# Patient Record
Sex: FEMALE | Race: WHITE | NOT HISPANIC OR LATINO | Employment: FULL TIME | URBAN - METROPOLITAN AREA
[De-identification: names, ages, dates, MRNs, and addresses within clinical notes are randomized per-mention and may not be internally consistent; named-entity substitution may affect disease eponyms.]

---

## 2020-03-30 ENCOUNTER — COMMUNICATION - HEALTHEAST (OUTPATIENT)
Dept: SCHEDULING | Facility: CLINIC | Age: 30
End: 2020-03-30

## 2020-03-31 ENCOUNTER — HOSPITAL ENCOUNTER (OUTPATIENT)
Dept: CT IMAGING | Facility: CLINIC | Age: 30
Discharge: HOME | End: 2020-03-31
Attending: FAMILY MEDICINE

## 2020-03-31 ENCOUNTER — OFFICE VISIT - HEALTHEAST (OUTPATIENT)
Dept: FAMILY MEDICINE | Facility: CLINIC | Age: 30
End: 2020-03-31

## 2020-03-31 DIAGNOSIS — K62.5 RECTAL BLEEDING: ICD-10-CM

## 2020-03-31 LAB
ALBUMIN SERPL-MCNC: 4.5 G/DL (ref 3.5–5)
ALP SERPL-CCNC: 39 U/L (ref 45–120)
ALT SERPL W P-5'-P-CCNC: 16 U/L (ref 0–45)
ANION GAP SERPL CALCULATED.3IONS-SCNC: 12 MMOL/L (ref 5–18)
AST SERPL W P-5'-P-CCNC: 18 U/L (ref 0–40)
BASOPHILS # BLD AUTO: 0 THOU/UL (ref 0–0.2)
BASOPHILS NFR BLD AUTO: 1 % (ref 0–2)
BILIRUB SERPL-MCNC: 1.3 MG/DL (ref 0–1)
BUN SERPL-MCNC: 10 MG/DL (ref 8–22)
C REACTIVE PROTEIN LHE: <0.1 MG/DL (ref 0–0.8)
CALCIUM SERPL-MCNC: 9.3 MG/DL (ref 8.5–10.5)
CHLORIDE BLD-SCNC: 105 MMOL/L (ref 98–107)
CO2 SERPL-SCNC: 23 MMOL/L (ref 22–31)
CREAT SERPL-MCNC: 0.79 MG/DL (ref 0.6–1.1)
EOSINOPHIL # BLD AUTO: 0.1 THOU/UL (ref 0–0.4)
EOSINOPHIL NFR BLD AUTO: 3 % (ref 0–6)
ERYTHROCYTE [DISTWIDTH] IN BLOOD BY AUTOMATED COUNT: 10.9 % (ref 11–14.5)
ERYTHROCYTE [SEDIMENTATION RATE] IN BLOOD BY WESTERGREN METHOD: 2 MM/HR (ref 0–20)
GFR SERPL CREATININE-BSD FRML MDRD: >60 ML/MIN/1.73M2
GLUCOSE BLD-MCNC: 95 MG/DL (ref 70–125)
HCG UR QL: NEGATIVE
HCT VFR BLD AUTO: 45.9 % (ref 35–47)
HGB BLD-MCNC: 15.6 G/DL (ref 12–16)
LYMPHOCYTES # BLD AUTO: 1.7 THOU/UL (ref 0.8–4.4)
LYMPHOCYTES NFR BLD AUTO: 33 % (ref 20–40)
MCH RBC QN AUTO: 31.8 PG (ref 27–34)
MCHC RBC AUTO-ENTMCNC: 33.9 G/DL (ref 32–36)
MCV RBC AUTO: 94 FL (ref 80–100)
MONOCYTES # BLD AUTO: 0.4 THOU/UL (ref 0–0.9)
MONOCYTES NFR BLD AUTO: 7 % (ref 2–10)
NEUTROPHILS # BLD AUTO: 2.9 THOU/UL (ref 2–7.7)
NEUTROPHILS NFR BLD AUTO: 56 % (ref 50–70)
PLATELET # BLD AUTO: 317 THOU/UL (ref 140–440)
PMV BLD AUTO: 7.2 FL (ref 7–10)
POTASSIUM BLD-SCNC: 3.6 MMOL/L (ref 3.5–5)
PROT SERPL-MCNC: 7.4 G/DL (ref 6–8)
RBC # BLD AUTO: 4.9 MILL/UL (ref 3.8–5.4)
SODIUM SERPL-SCNC: 140 MMOL/L (ref 136–145)
WBC: 5.1 THOU/UL (ref 4–11)

## 2020-03-31 ASSESSMENT — MIFFLIN-ST. JEOR: SCORE: 1182.61

## 2020-04-01 ENCOUNTER — COMMUNICATION - HEALTHEAST (OUTPATIENT)
Dept: FAMILY MEDICINE | Facility: CLINIC | Age: 30
End: 2020-04-01

## 2021-05-01 ENCOUNTER — HEALTH MAINTENANCE LETTER (OUTPATIENT)
Age: 31
End: 2021-05-01

## 2021-06-04 VITALS
HEART RATE: 96 BPM | SYSTOLIC BLOOD PRESSURE: 106 MMHG | OXYGEN SATURATION: 100 % | WEIGHT: 112.1 LBS | HEIGHT: 63 IN | DIASTOLIC BLOOD PRESSURE: 64 MMHG | BODY MASS INDEX: 19.86 KG/M2 | RESPIRATION RATE: 18 BRPM | TEMPERATURE: 98.3 F

## 2021-06-07 NOTE — TELEPHONE ENCOUNTER
Spoke with Kala at [facility] (formerly [facility])[3]. [id number] is scheduled with Dr. Hammonds at 12:45 PM today.     Akshat Li MD 04/01/20 9:57 AM

## 2021-06-07 NOTE — TELEPHONE ENCOUNTER
"----- Message from Ghanshyam Mayberry sent at 2020  7:06 AM CDT -----  Regarding: PEER TO PEER REQUEST  Peer to Peer Request    Patient Name:Grecia Post  :1990  MRN:[id number]    Dr. Li,    The authorization for procedure CT ABDOMEN PELVIS W ORAL W IV CONTRAST on date of service 2020 has been denied.Per Sally they can not approve the request because:  \"patient records do not show the results of a scope of their digestive tract. This is a test that uses a flexible tube with a tiny camera on the end to assess areas of the body without having to cut the areas open.\"  A [id number] review can be done by calling the insurance/third party authorization vendor with the following information:    Insurance:KEN CARRANZA  Auth Vendor: [email or web address]  Phone:[phone] opt. 1 to schedule a peer to peer  Due:ASAP    Patient ID:[id number]  Case/Ref #:[id number]    Please complete this as soon as you are able and let me know when it is done.    Thank you,    Ghanshyam CUTLER  [facility]      "

## 2021-06-07 NOTE — PROGRESS NOTES
[facility] Note                                                        Date of Visit: 3/31/2020     Chief Complaint   Grecia Post is a(n) 29 y.o. White or  female who presents to [facility] with the following complaint(s):  bright read blood after having bowel movements (started last evening - states stools softer than usual right now. )       Assessment and Plan   1. Rectal bleeding  - HM1(CBC and Differential)  - C-Reactive Protein(CRP)  - Sedimentation Rate  - Comprehensive Metabolic Panel  - Pregnancy, Urine  - HM1 (CBC with Diff)  - CT Abdomen Pelvis With Oral With IV Contrast; Future      Patient with past medical history of anxiety / depression presenting for evaluation of 3 episodes of bright red blood per rectum yesterday evening associated with soft but formed bowel movements. Patient is hemodynamically stable without signs of volume depletion. Differential diagnosis includes external hemorrhoids, anal fissure, internal hemorrhoids, rectal mass, proctitis, bleeding diverticula, and colitis (inflammatory, infectious, or ischemic). Patient is not experiencing significant abdominal pain or diarrhea that would suggest colitis. No external hemorrhoids, anal fissures, bleeding internal hemorrhoids, or rectal masses noted on digital rectal / anoscopic examination. A patch of friable mucosa with slight oozing was identified on the anterior aspect of the distal rectum during anoscopic examination. CBC, CRP, ESR, and CMP drawn to evaluate for leukocytosis that would suggest infection / inflammation, blood loss anemia, thrombocytopenia, inflammation that would suggest proctocolitis, electrolyte disturbances, renal dysfunction, and hepatitis. Pregnancy ruled out with a urine pregnancy test. CT of the abdomen / pelvis completed to evaluate for proctitis, rectal mass, diverticula, colitis, and other intraabdominal structural abnormalities. Called patient at 3:33 PM to discuss the negative / normal  "results of all diagnostic studies. Patient has not experienced another episode of rectal bleeding since being seen in clinic, though she has not yet had another bowel movement. Recommend continued monitoring of symptoms. Discussed low-residue diet and prevention of constipation. Discussed indications for reevaluation.     Counseled patient regarding assessment and plan for evaluation and treatment. Questions were answered.     Discussed signs / symptoms that warrant urgent / emergent medical attention.     Follow up within 3 days if symptoms persist / worsen.      History of Present Illness   Primary symptom: Rectal bleeding  Onset: Yesterday evening after a bowel movement.   Description: Bright red blood with bowel movements. Toilet water was dark red. Unsure if feces contained blood or not. Unsure if she passed any blood clots.   Frequency: Three episodes over the course of yesterday evening.   Progression: Has not had any episodes since 10:00 PM yesterday evening.   Anal / rectal pain: No  Abdominal pain: Has aching across the lower abdomen, like \"hunger pain,\" but reports that she has chronic abdominal pain secondary to anxiety. Pain is no worse than usual.   Appetite change: No  Reflux symptoms: No  Nausea: Yes, chronic, intermittent.   Vomiting: No  Diarrhea: No, but stools were softer than normal.   Constipation: No  Fevers: No  Chills: No  Additional symptoms: Night sweats for the past 3 nights.   History of similar bleeding: No  History of hemorrhoids: No  History of anal fissure: No  History of diverticulosis: No  History of diverticulitis: No  History of ischemic colitis: No  History of inflammatory bowel disease: No  History of colorectal cancer: No  History of abdominal surgery: No  Prior endoscopy: No  Family history of colorectal cancer: No  Family history of inflammatory bowel disease: No  Known anal trauma: No  Anticoagulant use: No  NSAID use: No  Additional pertinent history: Reports unintentional " "weight loss of 13 lbs over the past year.      Review of Systems   Review of Systems   All other systems reviewed and are negative.       Physical Exam   Vitals:    03/31/20 0726   BP: 106/64   Patient Site: Right Arm   Patient Position: Sitting   Cuff Size: Adult Regular   Pulse: 96   Resp: 18   Temp: 98.3  F (36.8  C)   TempSrc: Oral   SpO2: 100%   Weight: 112 lb 1.6 oz (50.8 kg)   Height: 5' 3\" (1.6 m)     Physical Exam  Vitals signs and nursing note reviewed.   Constitutional:       General: She is not in acute distress.     Appearance: She is well-developed and normal weight. She is not ill-appearing, toxic-appearing or diaphoretic.   HENT:      Mouth/Throat:      Mouth: Mucous membranes are moist. No oral lesions.      Pharynx: Oropharynx is clear.   Eyes:      General: Lids are normal. No scleral icterus.     Conjunctiva/sclera: Conjunctivae normal.   Neck:      Musculoskeletal: Neck supple. No edema or erythema.   Cardiovascular:      Rate and Rhythm: Normal rate and regular rhythm.      Heart sounds: S1 normal and S2 normal. No murmur. No friction rub. No gallop.    Pulmonary:      Effort: Pulmonary effort is normal.      Breath sounds: Normal breath sounds. No stridor. No wheezing, rhonchi or rales.   Abdominal:      General: Bowel sounds are normal. There is no distension.      Palpations: Abdomen is soft. There is no hepatomegaly, splenomegaly or mass.      Tenderness: There is no abdominal tenderness. There is no right CVA tenderness, left CVA tenderness or guarding.   Genitourinary:     Rectum: No mass, tenderness, anal fissure, external hemorrhoid or internal hemorrhoid. Normal anal tone.      Comments: Digital rectal examination and anoscopic examination (performed with a disposable plastic anoscope) were completed with a female LPN present as a chaperone / assistant. No anal fissures, bleeding internal hemorrhoids, or masses were identified. An irregular,  approximately 1 cm patch of friable mucosa " with slight oozing was identified on the anterior aspect of the distal rectum.   Lymphadenopathy:      Cervical: No cervical adenopathy.   Skin:     General: Skin is warm and dry.      Capillary Refill: Capillary refill takes less than 2 seconds.      Coloration: Skin is not jaundiced or pale.      Findings: No rash.   Neurological:      General: No focal deficit present.      Mental Status: She is alert and oriented to person, place, and time.   Psychiatric:         Mood and Affect: Mood is anxious.          Diagnostic Studies   Laboratory:  Results for orders placed or performed in visit on 03/31/20   C-Reactive Protein(CRP)   Result Value Ref Range    CRP <0.1 0.0 - 0.8 mg/dL   Sedimentation Rate   Result Value Ref Range    Sed Rate 2 0 - 20 mm/hr   Comprehensive Metabolic Panel   Result Value Ref Range    Sodium 140 136 - 145 mmol/L    Potassium 3.6 3.5 - 5.0 mmol/L    Chloride 105 98 - 107 mmol/L    CO2 23 22 - 31 mmol/L    Anion Gap, Calculation 12 5 - 18 mmol/L    Glucose 95 70 - 125 mg/dL    BUN 10 8 - 22 mg/dL    Creatinine 0.79 0.60 - 1.10 mg/dL    GFR MDRD Af Amer >60 >60 mL/min/1.73m2    GFR MDRD Non Af Amer >60 >60 mL/min/1.73m2    Bilirubin, Total 1.3 (H) 0.0 - 1.0 mg/dL    Calcium 9.3 8.5 - 10.5 mg/dL    Protein, Total 7.4 6.0 - 8.0 g/dL    Albumin 4.5 3.5 - 5.0 g/dL    Alkaline Phosphatase 39 (L) 45 - 120 U/L    AST 18 0 - 40 U/L    ALT 16 0 - 45 U/L   Pregnancy, Urine   Result Value Ref Range    Pregnancy Test, Urine Negative Negative   HM1 (CBC with Diff)   Result Value Ref Range    WBC 5.1 4.0 - 11.0 thou/uL    RBC 4.90 3.80 - 5.40 mill/uL    Hemoglobin 15.6 12.0 - 16.0 g/dL    Hematocrit 45.9 35.0 - 47.0 %    MCV 94 80 - 100 fL    MCH 31.8 27.0 - 34.0 pg    MCHC 33.9 32.0 - 36.0 g/dL    RDW 10.9 (L) 11.0 - 14.5 %    Platelets 317 140 - 440 thou/uL    MPV 7.2 7.0 - 10.0 fL    Neutrophils % 56 50 - 70 %    Lymphocytes % 33 20 - 40 %    Monocytes % 7 2 - 10 %    Eosinophils % 3 0 - 6 %    Basophils  % 1 0 - 2 %    Neutrophils Absolute 2.9 2.0 - 7.7 thou/uL    Lymphocytes Absolute 1.7 0.8 - 4.4 thou/uL    Monocytes Absolute 0.4 0.0 - 0.9 thou/uL    Eosinophils Absolute 0.1 0.0 - 0.4 thou/uL    Basophils Absolute 0.0 0.0 - 0.2 thou/uL     Radiology:  EXAM: CT ABDOMEN PELVIS W ORAL W IV CONTRAST  LOCATION: [facility]  DATE/TIME: 3/31/2020 10:29 AM  INDICATION: Rectal bleeding, evaluate for proctitis / colitis  COMPARISON: 11/12/2010 CT  TECHNIQUE: CT scan of the abdomen and pelvis was performed following injection of IV contrast. Multiplanar reformats were obtained. Dose reduction techniques were used.  CONTRAST: Iohexol (Omni) 100 mL  FINDINGS:   LOWER CHEST: Normal.  HEPATOBILIARY: Several small hepatic cysts. Normal-appearing gallbladder.  PANCREAS: Normal.  SPLEEN: Normal.  ADRENAL GLANDS: Normal.  KIDNEYS/BLADDER: Normal.  BOWEL: No rectal or colonic abnormality seen. Normal-appearing small bowel. Appendix not visualized.  LYMPH NODES: Normal.  VASCULATURE: Patent mesenteric vessels. Retroaortic left renal vein.  PELVIC ORGANS: IUD appears in good position. Several right ovarian follicles. No pelvic mass or free fluid.  MUSCULOSKELETAL: Normal.  IMPRESSION:   1.  No rectal bleeding etiology found.  2.  IUD.  3.  Remainder abdomen and pelvis appear normal.    Electrocardiogram:  N/A     Procedure Note   N/A     Pertinent History   The following portions of the patient's history were reviewed and updated as appropriate: allergies, current medications, past family history, past medical history, past social history, past surgical history and problem list.    Patient does not have a problem list on file.    Patient has a past medical history of Anxiety, Depression, and Seizure (H).    Patient has a past surgical history that includes Pickering tooth extraction; Bunionectomy (Left); and Augmentation mammaplasty.    Patient's family history includes Anxiety disorder in her mother; Atrial fibrillation in her  father; Depression in her mother; Fibrocystic breast disease in her mother; Lupus in her brother; Nephrolithiasis in her father; No Medical Problems in her brother; Other in her brother and sister; Pancreatic cancer in her maternal grandmother.    Patient reports that she has been smoking cigarettes. She has never used smokeless tobacco. She reports current alcohol use. She reports current drug use. Frequency: 2.00 times per week. Drug: Marijuana.     Portions of this note have been dictated using voice recognition software. Any grammatical or contextual distortions are unintentional and inherent to the software.    Akshat Li MD  [facility]

## 2021-06-07 NOTE — TELEPHONE ENCOUNTER
Case was reviewed with Dr. Bhavesh Hammonds at [facility]. CT was approved since outpatient colonoscopy cannot be facilitated at this time due to unavailability of gastroenterology consultation in the setting of the COVID-19 pandemic.     Approval No: [id number]  Exp: 9/28/2020    Akshat Li MD 04/01/20 12:57 PM

## 2021-06-07 NOTE — TELEPHONE ENCOUNTER
Pt is calling in about having rectal bleeding. Pt reports 1 stool with bright red blood and 1 stool with a small amount. Pt denies any clots, dizziness, or lightheadedness, or abdominal pain, Diarrhea, or vomiting, and pt denies hemorrhoids. Pt is not constipated and BM was formed, and not hard.   Pt has not traveled internationally, or to a high risk area, and has not been exposed to anyone who has tested positive for the Coronavirus.   Home care measures discussed, and per protocol, pt should be seen within 24 hours. Pt will monitor the bleeding overnight, and call if she has dizziness, or increased bleeding, and will call the [facility] in the morning for an appointment. Pt verbalized understanding.     Home care measures     Reason for Disposition    MODERATE rectal bleeding (small blood clots, passing blood without stool, or toilet water turns red)    Protocols used: RECTAL BLEEDING-A-AH

## 2021-10-16 ENCOUNTER — HEALTH MAINTENANCE LETTER (OUTPATIENT)
Age: 31
End: 2021-10-16

## 2022-05-22 ENCOUNTER — HEALTH MAINTENANCE LETTER (OUTPATIENT)
Age: 32
End: 2022-05-22

## 2022-06-08 ENCOUNTER — TRANSFERRED RECORDS (OUTPATIENT)
Dept: MULTI SPECIALTY CLINIC | Facility: CLINIC | Age: 32
End: 2022-06-08

## 2022-06-08 LAB
HPV ABSTRACT: NORMAL
PAP-ABSTRACT: ABNORMAL

## 2022-06-15 ENCOUNTER — LAB REQUISITION (OUTPATIENT)
Dept: LAB | Facility: CLINIC | Age: 32
End: 2022-06-15

## 2022-06-15 PROCEDURE — 86481 TB AG RESPONSE T-CELL SUSP: CPT | Performed by: INTERNAL MEDICINE

## 2022-06-16 LAB
GAMMA INTERFERON BACKGROUND BLD IA-ACNC: 0 IU/ML
M TB IFN-G BLD-IMP: NEGATIVE
M TB IFN-G CD4+ BCKGRND COR BLD-ACNC: 10 IU/ML
MITOGEN IGNF BCKGRD COR BLD-ACNC: 0 IU/ML
MITOGEN IGNF BCKGRD COR BLD-ACNC: 0 IU/ML
QUANTIFERON MITOGEN: 10 IU/ML
QUANTIFERON NIL TUBE: 0 IU/ML
QUANTIFERON TB1 TUBE: 0 IU/ML
QUANTIFERON TB2 TUBE: 0

## 2022-09-25 ENCOUNTER — HEALTH MAINTENANCE LETTER (OUTPATIENT)
Age: 32
End: 2022-09-25

## 2023-02-01 ASSESSMENT — ANXIETY QUESTIONNAIRES
3. WORRYING TOO MUCH ABOUT DIFFERENT THINGS: SEVERAL DAYS
GAD7 TOTAL SCORE: 9
4. TROUBLE RELAXING: SEVERAL DAYS
GAD7 TOTAL SCORE: 9
7. FEELING AFRAID AS IF SOMETHING AWFUL MIGHT HAPPEN: NEARLY EVERY DAY
7. FEELING AFRAID AS IF SOMETHING AWFUL MIGHT HAPPEN: NEARLY EVERY DAY
6. BECOMING EASILY ANNOYED OR IRRITABLE: NOT AT ALL
1. FEELING NERVOUS, ANXIOUS, OR ON EDGE: MORE THAN HALF THE DAYS
2. NOT BEING ABLE TO STOP OR CONTROL WORRYING: SEVERAL DAYS
8. IF YOU CHECKED OFF ANY PROBLEMS, HOW DIFFICULT HAVE THESE MADE IT FOR YOU TO DO YOUR WORK, TAKE CARE OF THINGS AT HOME, OR GET ALONG WITH OTHER PEOPLE?: SOMEWHAT DIFFICULT
5. BEING SO RESTLESS THAT IT IS HARD TO SIT STILL: SEVERAL DAYS
IF YOU CHECKED OFF ANY PROBLEMS ON THIS QUESTIONNAIRE, HOW DIFFICULT HAVE THESE PROBLEMS MADE IT FOR YOU TO DO YOUR WORK, TAKE CARE OF THINGS AT HOME, OR GET ALONG WITH OTHER PEOPLE: SOMEWHAT DIFFICULT
GAD7 TOTAL SCORE: 9

## 2023-02-01 ASSESSMENT — PATIENT HEALTH QUESTIONNAIRE - PHQ9
10. IF YOU CHECKED OFF ANY PROBLEMS, HOW DIFFICULT HAVE THESE PROBLEMS MADE IT FOR YOU TO DO YOUR WORK, TAKE CARE OF THINGS AT HOME, OR GET ALONG WITH OTHER PEOPLE: NOT DIFFICULT AT ALL
SUM OF ALL RESPONSES TO PHQ QUESTIONS 1-9: 1
SUM OF ALL RESPONSES TO PHQ QUESTIONS 1-9: 1

## 2023-02-03 ENCOUNTER — VIRTUAL VISIT (OUTPATIENT)
Dept: FAMILY MEDICINE | Facility: CLINIC | Age: 33
End: 2023-02-03
Payer: COMMERCIAL

## 2023-02-03 DIAGNOSIS — F42.8 OBSESSIONAL THOUGHTS: ICD-10-CM

## 2023-02-03 DIAGNOSIS — F41.1 GAD (GENERALIZED ANXIETY DISORDER): Primary | ICD-10-CM

## 2023-02-03 PROCEDURE — 99214 OFFICE O/P EST MOD 30 MIN: CPT | Mod: GT | Performed by: FAMILY MEDICINE

## 2023-02-03 RX ORDER — ESCITALOPRAM OXALATE 10 MG/1
TABLET ORAL
Qty: 30 TABLET | Refills: 1 | Status: SHIPPED | OUTPATIENT
Start: 2023-02-03 | End: 2023-03-23

## 2023-02-03 ASSESSMENT — PATIENT HEALTH QUESTIONNAIRE - PHQ9
SUM OF ALL RESPONSES TO PHQ QUESTIONS 1-9: 1
10. IF YOU CHECKED OFF ANY PROBLEMS, HOW DIFFICULT HAVE THESE PROBLEMS MADE IT FOR YOU TO DO YOUR WORK, TAKE CARE OF THINGS AT HOME, OR GET ALONG WITH OTHER PEOPLE: NOT DIFFICULT AT ALL

## 2023-02-03 ASSESSMENT — ANXIETY QUESTIONNAIRES: GAD7 TOTAL SCORE: 9

## 2023-02-03 NOTE — PROGRESS NOTES
Grecia is a 32 year old who is being evaluated via a billable video visit.      How would you like to obtain your AVS? MyChart  If the video visit is dropped, the invitation should be resent by: Text to cell phone: [phone]  Will anyone else be joining your video visit? No       Assessment & Plan     ANURAG (generalized anxiety disorder) - untreated, will initiate MH referral and start lexapro. Advised 6 week follow up to check progress.   - escitalopram (LEXAPRO) 10 MG tablet; Take 5 mg daily for 2 weeks, then increase to 10 mg daily  - Adult Mental Health  Referral; Future    Obsessional thoughts - as above, contracts for safety today.   - escitalopram (LEXAPRO) 10 MG tablet; Take 5 mg daily for 2 weeks, then increase to 10 mg daily  - Adult Mental Health  Referral; Future    Return in about 6 weeks (around 3/17/2023) for Medication Recheck.    Izzy Day MD  [facility]    Kristy Paige is a 32 year old, presenting for the following health issues:  Depression      History of Present Illness       Mental Health Follow-up:  Patient presents to follow-up on Depression & Anxiety.Patient's depression since last visit has been:  Medium  The patient is having other symptoms associated with depression.  Patient's anxiety since last visit has been:  Medium  The patient is having other symptoms associated with anxiety.  Any significant life events: other  Patient is feeling anxious or having panic attacks.  Patient has no concerns about alcohol or drug use.    She eats 0-1 servings of fruits and vegetables daily.She consumes 2 sweetened beverage(s) daily.She exercises with enough effort to increase her heart rate 10 to 19 minutes per day.  She exercises with enough effort to increase her heart rate 3 or less days per week.   She is taking medications regularly.    Today's PHQ-9         PHQ-9 Total Score: 1    PHQ-9 Q9 Thoughts of better off dead/self-harm past  2 weeks :   Not at all    How difficult have these problems made it for you to do your work, take care of things at home, or get along with other people: Not difficult at all  Today's ANURAG-7 Score: 9         Review of Systems   Constitutional, HEENT, cardiovascular, pulmonary, gi and gu systems are negative, except as otherwise noted.      Objective           Vitals:  No vitals were obtained today due to virtual visit.    Physical Exam   GENERAL: Healthy, alert and no distress  EYES: Eyes grossly normal to inspection.  No discharge or erythema, or obvious scleral/conjunctival abnormalities.  RESP: No audible wheeze, cough, or visible cyanosis.  No visible retractions or increased work of breathing.    SKIN: Visible skin clear. No significant rash, abnormal pigmentation or lesions.  NEURO: Cranial nerves grossly intact.  Mentation and speech appropriate for age.  PSYCH: Mentation appears normal, affect normal/bright, judgement and insight intact, normal speech and appearance well-groomed.    PHQ 2/1/2023   PHQ-9 Total Score 1   Q9: Thoughts of better off dead/self-harm past 2 weeks Not at all     ANURAG-7 SCORE 2/1/2023   Total Score 9 (mild anxiety)   Total Score 9               Video-Visit Details    Type of service:  Video Visit     Originating Location (pt. Location): Home    Distant Location (provider location):  Off-site  Platform used for Video Visit: [employer]

## 2023-03-23 ENCOUNTER — VIRTUAL VISIT (OUTPATIENT)
Dept: FAMILY MEDICINE | Facility: CLINIC | Age: 33
End: 2023-03-23
Payer: COMMERCIAL

## 2023-03-23 DIAGNOSIS — F42.8 OBSESSIONAL THOUGHTS: ICD-10-CM

## 2023-03-23 DIAGNOSIS — F41.1 GAD (GENERALIZED ANXIETY DISORDER): Primary | ICD-10-CM

## 2023-03-23 PROCEDURE — 99213 OFFICE O/P EST LOW 20 MIN: CPT | Mod: VID | Performed by: FAMILY MEDICINE

## 2023-03-23 RX ORDER — ESCITALOPRAM OXALATE 10 MG/1
15 TABLET ORAL DAILY
Qty: 135 TABLET | Refills: 3 | Status: ON HOLD | OUTPATIENT
Start: 2023-03-23 | End: 2023-12-04

## 2023-03-23 NOTE — PROGRESS NOTES
Grecia is a 32 year old who is being evaluated via a billable video visit.      How would you like to obtain your AVS? MyChart  If the video visit is dropped, the invitation should be resent by: Text to cell phone: [phone]  Will anyone else be joining your video visit? No         Assessment & Plan     ANURAG (generalized anxiety disorder) - will trial increased dose of lexapro per her request. Refills sent. She will establish care with a PCP closer to home in May. Advised refills and dose changes can come from them going forward.   - escitalopram (LEXAPRO) 10 MG tablet; Take 1.5 tablets (15 mg) by mouth daily    Obsessional thought - as above  - escitalopram (LEXAPRO) 10 MG tablet; Take 1.5 tablets (15 mg) by mouth daily    Izzy Day MD  [facility]   Grecia is a 32 year old, presenting for the following health issues:  Recheck Medication (lexapro)    Additional Questions 3/23/2023   Roomed by Sandra   Accompanied by Self     Patient Reported Additional Medications 3/23/2023   Patient reports taking the following new medications None     History of Present Illness       Reason for visit:  Anxiety med follow up    She eats 0-1 servings of fruits and vegetables daily.She consumes 1 sweetened beverage(s) daily.She exercises with enough effort to increase her heart rate 10 to 19 minutes per day.  She exercises with enough effort to increase her heart rate 3 or less days per week.   She is taking medications regularly.     Medication Followup of Lexapro    Taking Medication as prescribed: yes    Side Effects:  None    Medication Helping Symptoms:  yes      Reports improved anxiety. On lexapro 10 mg daily. Feels like a trial of higher dose would be good.        Review of Systems   Constitutional, HEENT, cardiovascular, pulmonary, gi and gu systems are negative, except as otherwise noted.      Objective    Vitals - Patient Reported  Weight (Patient Reported): 52.2 kg  "(115 lb)  Height (Patient Reported): 160 cm (5' 3\")  BMI (Based on Pt Reported Ht/Wt): 20.37    Physical Exam   GENERAL: Healthy, alert and no distress  EYES: Eyes grossly normal to inspection.  No discharge or erythema, or obvious scleral/conjunctival abnormalities.  RESP: No audible wheeze, cough, or visible cyanosis.  No visible retractions or increased work of breathing.    SKIN: Visible skin clear. No significant rash, abnormal pigmentation or lesions.  NEURO: Cranial nerves grossly intact.  Mentation and speech appropriate for age.  PSYCH: Mentation appears normal, affect normal/bright, judgement and insight intact, normal speech and appearance well-groomed.          Video-Visit Details    Type of service:  Video Visit     Originating Location (pt. Location): Home    Distant Location (provider location):  Off-site  Platform used for Video Visit: [employer]"

## 2023-03-25 ENCOUNTER — OFFICE VISIT (OUTPATIENT)
Dept: FAMILY MEDICINE | Facility: CLINIC | Age: 33
End: 2023-03-25
Payer: COMMERCIAL

## 2023-03-25 VITALS
BODY MASS INDEX: 20.37 KG/M2 | HEART RATE: 70 BPM | RESPIRATION RATE: 18 BRPM | DIASTOLIC BLOOD PRESSURE: 74 MMHG | WEIGHT: 115 LBS | OXYGEN SATURATION: 99 % | TEMPERATURE: 98.2 F | SYSTOLIC BLOOD PRESSURE: 115 MMHG

## 2023-03-25 DIAGNOSIS — J02.0 STREP THROAT: Primary | ICD-10-CM

## 2023-03-25 DIAGNOSIS — Z20.818 EXPOSURE TO STREP THROAT: ICD-10-CM

## 2023-03-25 PROBLEM — R87.612 LGSIL OF CERVIX OF UNDETERMINED SIGNIFICANCE: Status: ACTIVE | Noted: 2022-06-08

## 2023-03-25 LAB — DEPRECATED S PYO AG THROAT QL EIA: POSITIVE

## 2023-03-25 PROCEDURE — 99213 OFFICE O/P EST LOW 20 MIN: CPT | Performed by: NURSE PRACTITIONER

## 2023-03-25 PROCEDURE — 87880 STREP A ASSAY W/OPTIC: CPT | Performed by: NURSE PRACTITIONER

## 2023-03-25 RX ORDER — AMOXICILLIN 500 MG/1
500 CAPSULE ORAL 2 TIMES DAILY
Qty: 20 CAPSULE | Refills: 0 | Status: SHIPPED | OUTPATIENT
Start: 2023-03-25 | End: 2023-04-04

## 2023-03-25 NOTE — PROGRESS NOTES
Chief Complaint   Patient presents with     Pharyngitis     Exposure to strep     SUBJECTIVE:  Grecia Post is a 32 year old female presenting with sore throat white patches on throat swollen lymph nodes for a day.  Her twins have strep.    Past Medical History:   Diagnosis Date     Anxiety      Depression      Seizure (H)      escitalopram (LEXAPRO) 10 MG tablet, Take 1.5 tablets (15 mg) by mouth daily    No current facility-administered medications on file prior to visit.    Social History     Tobacco Use     Smoking status: Former     Types: Cigarettes     Smokeless tobacco: Never     Tobacco comments:     No Vaping   Substance Use Topics     Alcohol use: Yes     Allergies   Allergen Reactions     Other Drug Allergy (See Comments) Unknown     Contrast media  Unsure what reaction was.        Review of Systems   All systems negative except for those listed above in HPI.    OBJECTIVE:   /74   Pulse 70   Temp 98.2  F (36.8  C) (Oral)   Resp 18   Wt 52.2 kg (115 lb)   LMP 03/02/2023   SpO2 99%   BMI 20.37 kg/m       Physical Exam  Vitals reviewed.   Constitutional:       General: She is not in acute distress.     Appearance: Normal appearance. She is well-developed. She is not ill-appearing, toxic-appearing or diaphoretic.   HENT:      Head: Normocephalic and atraumatic.      Mouth/Throat:      Pharynx: Oropharyngeal exudate and posterior oropharyngeal erythema present.   Eyes:      Conjunctiva/sclera: Conjunctivae normal.      Pupils: Pupils are equal, round, and reactive to light.   Cardiovascular:      Rate and Rhythm: Normal rate.      Pulses: Normal pulses.   Pulmonary:      Effort: Pulmonary effort is normal. No respiratory distress.   Musculoskeletal:         General: Normal range of motion.      Cervical back: Normal range of motion and neck supple.   Lymphadenopathy:      Cervical: Cervical adenopathy present.   Skin:     General: Skin is warm.      Capillary Refill: Capillary refill takes  "less than 2 seconds.      Findings: No rash.   Neurological:      General: No focal deficit present.      Mental Status: She is alert and oriented to person, place, and time.   Psychiatric:         Mood and Affect: Mood normal.         Behavior: Behavior normal.       Results for orders placed or performed in visit on 03/25/23   Streptococcus A Rapid Screen w/Reflex to PCR - Clinic Collect     Status: Abnormal    Specimen: Throat; Swab   Result Value Ref Range    Group A Strep antigen Positive (A) Negative     ASSESSMENT:    ICD-10-CM    1. Strep throat  J02.0 amoxicillin (AMOXIL) 500 MG capsule      2. Exposure to strep throat  Z20.818 Streptococcus A Rapid Screen w/Reflex to PCR - Clinic Collect        PLAN:     Antibiotics as directed.  Drink plenty of fluids and rest.  May use salt water gargles- about 8 oz warm water with about 1 teaspoon salt  Sucrets and Cepacol spray are over the counter medications that numb the throat.  Over the counter pain relievers such as tylenol or ibuprofen may be used as needed.   Honey lemon tea helps to soothe the throat. \"Throat Coat\" tea is soothing as well.  Change toothbrush after 24 hours of antibiotics (may soak in 3-6% hydrogen peroxide)  Will be contagious for 24 hours after starting antibiotic  May return to school//work/activities 24 hours after antibiotics are started.  Wash hands frequently and do not share beverages.  Please follow up with primary care provider if symptoms are not improving, worsening or new symptoms or for any adverse reactions to medications.     Follow up with primary care provider with any problems, questions or concerns or if symptoms worsen or fail to improve. Patient agreed to plan and verbalized understanding.    TIFFANI Boyd-[facility]  "

## 2023-04-28 ENCOUNTER — LAB REQUISITION (OUTPATIENT)
Dept: LAB | Facility: CLINIC | Age: 33
End: 2023-04-28
Payer: COMMERCIAL

## 2023-04-28 DIAGNOSIS — Z34.81 ENCOUNTER FOR SUPERVISION OF OTHER NORMAL PREGNANCY, FIRST TRIMESTER: ICD-10-CM

## 2023-04-28 LAB
BASOPHILS # BLD AUTO: 0.1 10E3/UL (ref 0–0.2)
BASOPHILS NFR BLD AUTO: 1 %
EOSINOPHIL # BLD AUTO: 0.2 10E3/UL (ref 0–0.7)
EOSINOPHIL NFR BLD AUTO: 2 %
ERYTHROCYTE [DISTWIDTH] IN BLOOD BY AUTOMATED COUNT: 13.1 % (ref 10–15)
HCT VFR BLD AUTO: 40.9 % (ref 35–47)
HGB BLD-MCNC: 13.6 G/DL (ref 11.7–15.7)
IMM GRANULOCYTES # BLD: 0 10E3/UL
IMM GRANULOCYTES NFR BLD: 1 %
LYMPHOCYTES # BLD AUTO: 1.9 10E3/UL (ref 0.8–5.3)
LYMPHOCYTES NFR BLD AUTO: 23 %
MCH RBC QN AUTO: 30 PG (ref 26.5–33)
MCHC RBC AUTO-ENTMCNC: 33.3 G/DL (ref 31.5–36.5)
MCV RBC AUTO: 90 FL (ref 78–100)
MONOCYTES # BLD AUTO: 0.5 10E3/UL (ref 0–1.3)
MONOCYTES NFR BLD AUTO: 6 %
NEUTROPHILS # BLD AUTO: 5.7 10E3/UL (ref 1.6–8.3)
NEUTROPHILS NFR BLD AUTO: 67 %
NRBC # BLD AUTO: 0 10E3/UL
NRBC BLD AUTO-RTO: 0 /100
PLATELET # BLD AUTO: 321 10E3/UL (ref 150–450)
RBC # BLD AUTO: 4.53 10E6/UL (ref 3.8–5.2)
WBC # BLD AUTO: 8.4 10E3/UL (ref 4–11)

## 2023-04-28 PROCEDURE — 87086 URINE CULTURE/COLONY COUNT: CPT | Mod: ORL | Performed by: NURSE PRACTITIONER

## 2023-04-28 PROCEDURE — 86747 PARVOVIRUS ANTIBODY: CPT | Mod: ORL | Performed by: NURSE PRACTITIONER

## 2023-04-28 PROCEDURE — 80081 OBSTETRIC PANEL INC HIV TSTG: CPT | Mod: ORL | Performed by: NURSE PRACTITIONER

## 2023-04-28 PROCEDURE — 86803 HEPATITIS C AB TEST: CPT | Mod: ORL | Performed by: NURSE PRACTITIONER

## 2023-04-29 LAB
ABO/RH(D): NORMAL
ANTIBODY SCREEN: NEGATIVE
HBV SURFACE AG SERPL QL IA: NONREACTIVE
HCV AB SERPL QL IA: NONREACTIVE
HIV 1+2 AB+HIV1 P24 AG SERPL QL IA: NONREACTIVE
SPECIMEN EXPIRATION DATE: NORMAL

## 2023-04-30 LAB
B19V IGG SER IA-ACNC: 1.19 IV
B19V IGM SER IA-ACNC: 0.58 IV
BACTERIA UR CULT: NO GROWTH

## 2023-05-01 LAB
RPR SER QL: NONREACTIVE
RUBV IGG SERPL QL IA: 2.18 INDEX
RUBV IGG SERPL QL IA: POSITIVE

## 2023-06-04 ENCOUNTER — HEALTH MAINTENANCE LETTER (OUTPATIENT)
Age: 33
End: 2023-06-04

## 2023-06-06 ENCOUNTER — OFFICE VISIT (OUTPATIENT)
Dept: FAMILY MEDICINE | Facility: CLINIC | Age: 33
End: 2023-06-06
Payer: COMMERCIAL

## 2023-06-06 VITALS
WEIGHT: 124 LBS | HEIGHT: 63 IN | OXYGEN SATURATION: 100 % | DIASTOLIC BLOOD PRESSURE: 72 MMHG | TEMPERATURE: 98.1 F | HEART RATE: 75 BPM | BODY MASS INDEX: 21.97 KG/M2 | RESPIRATION RATE: 15 BRPM | SYSTOLIC BLOOD PRESSURE: 108 MMHG

## 2023-06-06 DIAGNOSIS — Z76.89 ENCOUNTER TO ESTABLISH CARE: ICD-10-CM

## 2023-06-06 DIAGNOSIS — R87.612 LGSIL OF CERVIX OF UNDETERMINED SIGNIFICANCE: ICD-10-CM

## 2023-06-06 DIAGNOSIS — Z98.891 H/O CESAREAN SECTION: ICD-10-CM

## 2023-06-06 DIAGNOSIS — Z12.4 CERVICAL CANCER SCREENING: ICD-10-CM

## 2023-06-06 DIAGNOSIS — F41.1 GENERALIZED ANXIETY DISORDER: ICD-10-CM

## 2023-06-06 DIAGNOSIS — O09.529 SUPERVISION OF HIGH-RISK PREGNANCY OF ELDERLY MULTIGRAVIDA: Primary | ICD-10-CM

## 2023-06-06 PROCEDURE — 99207 PR PRENATAL VISIT: CPT | Performed by: FAMILY MEDICINE

## 2023-06-06 RX ORDER — CHOLECALCIFEROL (VITAMIN D3) 50 MCG
50 TABLET ORAL DAILY
COMMUNITY

## 2023-06-06 NOTE — PROGRESS NOTES
SUBJECTIVE:      33 year old, female,  C section for Twin pregnancy 22,  who presents to the clinic today for a new ob visit.    Feels well. Has started PNV.  Estimated Date of Delivery: Dec 7, 2023   Dec 7, 2023 is calculated from Patient's last menstrual period was 2023 (approximate)..  Patient had a confirmation ultrasound which matched her due date by LMP  She has not had bleeding since her LMP.   She has not had nausea. Weight loss has not occurred.   This was not a planned pregnancy.   FOB is involved, Mervin Griffin significant other  Single, lives with boy friend and identical twins       Answers for HPI/ROS submitted by the patient on 2023  What is the reason for your visit today? : Transfer care for current pregnancy  How many servings of fruits and vegetables do you eat daily?: 0-1  On average, how many sweetened beverages do you drink each day (Examples: soda, juice, sweet tea, etc.  Do NOT count diet or artificially sweetened beverages)?: 1  How many minutes a day do you exercise enough to make your heart beat faster?: 10 to 19  How many days a week do you exercise enough to make your heart beat faster?: 3 or less  How many days per week do you miss taking your medication?: 0      OTHER CONCERNS:  Transfer care , and like  Do  TOLAC    ===========================================  ROS  PSYCHIATRIC:  Denies panic attacks  PHQ9: Last PHQ-9 score on record= No Value exists for the : HP#PHQ9  Social History     Tobacco Use     Smoking status: Former     Types: Cigarettes     Smokeless tobacco: Never     Tobacco comments:     No Vaping   Vaping Use     Vaping status: Never Used   Substance Use Topics     Alcohol use: Yes     History   Drug Use     Frequency: 2.0 times per week     Types: Marijuana     History   Smoking Status     Former     Types: Cigarettes   Smokeless Tobacco     Never     Social History    Substance and Sexual Activity      Alcohol use: Yes    Family History    Problem Relation Age of Onset     Atrial fibrillation Father      Nephrolithiasis Father      Pancreatic Cancer Maternal Grandmother      Anxiety Disorder Mother      Depression Mother      Fibrocystic breast disease Mother      Other - See Comments Sister         Vertebral artery dissection     Other - See Comments Brother      Lupus Brother      No Known Problems Brother      ============================================  MEDICAL HISTORY   Allergies   Allergen Reactions     Contrast Dye      PN: LW CM1: >>> NO CONTRAST ADVERSE REACTION <<<     Reaction :     Methylpyrrolidone Unknown     Other Drug Allergy (See Comments) Unknown     Contrast media  Unsure what reaction was.        [facility]      Current Outpatient Medications:      escitalopram (LEXAPRO) 10 MG tablet, Take 1.5 tablets (15 mg) by mouth daily, Disp: 135 tablet, Rfl: 3     Prenatal Vit-Fe Fumarate-FA (PRENATAL PO), , Disp: , Rfl:      vitamin D3 (CHOLECALCIFEROL) 50 mcg (2000 units) tablet, Take 1 tablet by mouth daily, Disp: , Rfl:     Past Medical History:   Diagnosis Date     Anxiety      Depression      Seizure (H)        Past Surgical History:   Procedure Laterality Date     BUNIONECTOMY Left      MAMMOPLASTY AUGMENTATION       WISDOM TOOTH EXTRACTION              OB History    Para Term  AB Living   2 1 1 0 1 2   SAB IAB Ectopic Multiple Live Births   0 0 1 2 2      # Outcome Date GA Lbr Ujstice/2nd Weight Sex Delivery Anes PTL Lv   2 Current            1A Ectopic 22 38w2d  2.48 kg (5 lb 7.5 oz) M CS-Unspec   KAYLEY      Birth Comments: scheduled C section for twins      Name: Berhane      Apgar1: 8  Apgar5: 9   1B Term 22 38w2d  2.722 kg (6 lb) M CS-Unspec   KAYLEY      Name: Praveen      Apgar1: 7  Apgar5: 9       GYN History-  Abnormal Pap Smears- yes plan repeat pap next visit - LGSIL with neg HPV                        Cervical procedures: none                         History of STI: none   I personally reviewed the past  "social/family/medical and surgical history on the date of service.   I reviewed lab work done at Intake visit with patient.    OBJECTIVE:   PHYSICAL EXAM:  /72 (BP Location: Left arm, Patient Position: Sitting, Cuff Size: Adult Regular)   Pulse 75   Temp 98.1  F (36.7  C) (Oral)   Resp 15   Ht 1.6 m (5' 3\")   Wt 56.2 kg (124 lb)   LMP 2023 (Approximate)   SpO2 100%   BMI 21.97 kg/m    BMI- Body mass index is 21.97 kg/m ., GENERAL:  Pleasant pregnant female, alert, cooperative  and well groomed.    PHYSICAL EXAM  General: Alert, no acute distress.   EYES normocephalic conjunctivae are cuauhtemoc,   EAR Normal pearly TMs bilaterally without erythema, pus or fluid  Nose is clear.  Oropharynx is moist and clear,   Neck: supple without adenopathy or thyromegaly.  Lungs: Good aeration bilaterally.Clear to auscultation without wheezes, rales or rhonci.    Heart: regular rate and rhythm, normal S1 and S2, no murmurs  Pelvic exam: deferred .   Abdomen: soft and nontender, bowel sounds are present, no hepatosplenomegaly or mass palpable.  Skin: clear without rash or lesions  Neuro: normal muscle tone in all 4 extremities, deep tendon reflexes 2+ symmetrically at the patella     Intrauterine pregnancy  At 13w5d  consistent with dates    Genetic Screening: First Trimester Screen    Grecia was seen today for confirmation of pregnancy.    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  Establishing care for her second pregnancy.  Started prenatal care at [facility]    Other orders  -     PRIMARY CARE FOLLOW-UP SCHEDULING; Future         PLAN:    Orders Placed This Encounter   Procedures     US OB <14 Weeks w Transvaginal Single         Discussed:  - Pre-pregnancy  Body mass index is 21.97 kg/m .   Reviewed best evidence for: weight gain for her weight and height for pregnancy:  RECOMMENDED WEIGHT GAIN: 15-25 lbs.   healthy diet and foods to avoid; exercise and activity during pregnancy;avoiding exposure to toxoplasmosis; and maintenance of a generally healthy lifestyle.   - Influenza vaccine  UTD  - COVID vaccinated  - Genetic screening options, including false positive rate with screening tests and diagnostic options (chorionic villus sampling, amniocentesis),     - Safe medications during pregnancy and prenatal vitamin daily discussed.   - Healthy habits including not using tobacco or alcohol, exercising regularly and maintaining healthy diet  - Information given on tips for dealing with nausea, healthy habits, exposures, safety, prenatal appointment schedule, and when to call the doctor.  - Recommendations for breastfeeding given.    - Preeclampsia risk factors:    High risk factors (1+): none    Moderate risk factors (2+): none    Based on her risk factors,  Grecia Post  is NOT at high risk of preeclampsia. Low-dose aspirin prophylaxis is NOT recommended for prevention of preeclampsia.     - Reviewed use of triage nurse line and contacting the on-call provider after hours for an urgent need such as fever, vagina bleeding, bladder or vaginal infection, rupture of membranes,  or term labor.    -   - Discussed the harms, benefits, side effects and alternative therapies for current prescribed and OTC medications.  Orders Placed This Encounter   Medications     Prenatal Vit-Fe Fumarate-FA (PRENATAL PO)     vitamin D3  (CHOLECALCIFEROL) 50 mcg (2000 units) tablet     Sig: Take 1 tablet by mouth daily     - All pt's  questions discussed and answered.  Pt verbalized understanding of and agreement to plan of care.     - Continue scheduled prenatal care and prn if questions or concerns    Sherrie Floyd MD 6/6/2023 7:28 AM   [facility].  [phone]

## 2023-07-17 ENCOUNTER — OFFICE VISIT (OUTPATIENT)
Dept: FAMILY MEDICINE | Facility: CLINIC | Age: 33
End: 2023-07-17
Attending: FAMILY MEDICINE
Payer: COMMERCIAL

## 2023-07-17 VITALS
HEART RATE: 74 BPM | WEIGHT: 132.7 LBS | SYSTOLIC BLOOD PRESSURE: 108 MMHG | DIASTOLIC BLOOD PRESSURE: 70 MMHG | BODY MASS INDEX: 23.51 KG/M2 | TEMPERATURE: 97.9 F | HEIGHT: 63 IN | OXYGEN SATURATION: 99 %

## 2023-07-17 DIAGNOSIS — O09.529 SUPERVISION OF HIGH-RISK PREGNANCY OF ELDERLY MULTIGRAVIDA: Primary | ICD-10-CM

## 2023-07-17 DIAGNOSIS — F32.0 CURRENT MILD EPISODE OF MAJOR DEPRESSIVE DISORDER WITHOUT PRIOR EPISODE (H): ICD-10-CM

## 2023-07-17 DIAGNOSIS — Z12.4 CERVICAL CANCER SCREENING: ICD-10-CM

## 2023-07-17 DIAGNOSIS — R87.612 LGSIL OF CERVIX OF UNDETERMINED SIGNIFICANCE: ICD-10-CM

## 2023-07-17 PROCEDURE — 87624 HPV HI-RISK TYP POOLED RSLT: CPT | Performed by: FAMILY MEDICINE

## 2023-07-17 PROCEDURE — G0145 SCR C/V CYTO,THINLAYER,RESCR: HCPCS | Performed by: FAMILY MEDICINE

## 2023-07-17 PROCEDURE — 99207 PR PRENATAL VISIT: CPT | Performed by: FAMILY MEDICINE

## 2023-07-17 ASSESSMENT — PAIN SCALES - GENERAL: PAINLEVEL: NO PAIN (0)

## 2023-07-17 NOTE — PROGRESS NOTES
Doing very well , taking her  Prenatal vitamins   Warning signs discussed for  labor   Stefan wt gain  Pap done today   Invitae Normal -GIRL  Will do consent for TOLAC- C section for TWINS  Follow up 4wk for routine prenatal care  Grecia was seen today for prenatal care.    Diagnoses and all orders for this visit:    Supervision of high-risk pregnancy of elderly multigravida    Current mild episode of major depressive disorder without prior episode (H)    LGSIL of cervix of undetermined significance  Comments:  pap done td    Cervical cancer screening  -     Pap Screen with HPV - recommended age 30 - 65 years    Other orders  -     PRIMARY CARE FOLLOW-UP SCHEDULING

## 2023-07-20 LAB
BKR LAB AP GYN ADEQUACY: NORMAL
BKR LAB AP GYN INTERPRETATION: NORMAL
BKR LAB AP HPV REFLEX: NORMAL
BKR LAB AP LMP: NORMAL
BKR LAB AP PREVIOUS ABNL DX: NORMAL
BKR LAB AP PREVIOUS ABNORMAL: NORMAL
PATH REPORT.COMMENTS IMP SPEC: NORMAL
PATH REPORT.COMMENTS IMP SPEC: NORMAL
PATH REPORT.RELEVANT HX SPEC: NORMAL

## 2023-07-21 ENCOUNTER — HOSPITAL ENCOUNTER (OUTPATIENT)
Dept: ULTRASOUND IMAGING | Facility: CLINIC | Age: 33
Discharge: HOME | End: 2023-07-21
Attending: FAMILY MEDICINE | Admitting: FAMILY MEDICINE
Payer: COMMERCIAL

## 2023-07-21 DIAGNOSIS — O09.529 SUPERVISION OF HIGH-RISK PREGNANCY OF ELDERLY MULTIGRAVIDA: ICD-10-CM

## 2023-07-21 LAB
HUMAN PAPILLOMA VIRUS 16 DNA: NEGATIVE
HUMAN PAPILLOMA VIRUS 18 DNA: NEGATIVE
HUMAN PAPILLOMA VIRUS FINAL DIAGNOSIS: ABNORMAL
HUMAN PAPILLOMA VIRUS OTHER HR: POSITIVE

## 2023-07-21 PROCEDURE — 76805 OB US >/= 14 WKS SNGL FETUS: CPT

## 2023-07-24 ENCOUNTER — PATIENT OUTREACH (OUTPATIENT)
Dept: FAMILY MEDICINE | Facility: CLINIC | Age: 33
End: 2023-07-24
Payer: COMMERCIAL

## 2023-08-22 ASSESSMENT — PATIENT HEALTH QUESTIONNAIRE - PHQ9
SUM OF ALL RESPONSES TO PHQ QUESTIONS 1-9: 0
SUM OF ALL RESPONSES TO PHQ QUESTIONS 1-9: 0
10. IF YOU CHECKED OFF ANY PROBLEMS, HOW DIFFICULT HAVE THESE PROBLEMS MADE IT FOR YOU TO DO YOUR WORK, TAKE CARE OF THINGS AT HOME, OR GET ALONG WITH OTHER PEOPLE: NOT DIFFICULT AT ALL

## 2023-08-23 ENCOUNTER — OFFICE VISIT (OUTPATIENT)
Dept: FAMILY MEDICINE | Facility: CLINIC | Age: 33
End: 2023-08-23
Payer: COMMERCIAL

## 2023-08-23 VITALS
DIASTOLIC BLOOD PRESSURE: 60 MMHG | BODY MASS INDEX: 25.2 KG/M2 | OXYGEN SATURATION: 97 % | HEIGHT: 63 IN | SYSTOLIC BLOOD PRESSURE: 100 MMHG | TEMPERATURE: 97.8 F | HEART RATE: 84 BPM | WEIGHT: 142.2 LBS | RESPIRATION RATE: 12 BRPM

## 2023-08-23 DIAGNOSIS — F32.0 CURRENT MILD EPISODE OF MAJOR DEPRESSIVE DISORDER WITHOUT PRIOR EPISODE (H): ICD-10-CM

## 2023-08-23 DIAGNOSIS — O09.529 SUPERVISION OF HIGH-RISK PREGNANCY OF ELDERLY MULTIGRAVIDA: Primary | ICD-10-CM

## 2023-08-23 DIAGNOSIS — Z98.891 H/O CESAREAN SECTION: ICD-10-CM

## 2023-08-23 PROCEDURE — 99207 PR PRENATAL VISIT: CPT | Performed by: FAMILY MEDICINE

## 2023-08-23 NOTE — PROGRESS NOTES
Doing very well , taking her  Prenatal vitamins   Warning signs discussed for  labor   Appropriate  wt gain  Invitae Normal -GIRL  Will do consent for TOLAC- C section for TWINS  Follow up 4wk for routine prenatal care as well as 1 hr GTT      Grecia was seen today for prenatal care.    Diagnoses and all orders for this visit:    Supervision of high-risk pregnancy of elderly multigravida  -     Glucose tolerance gest screen 1 hour; Future  -     Hemoglobin; Future  -     Treponema Abs w Reflex to RPR and Titer; Future    Current mild episode of major depressive disorder without prior episode (H)    H/O  section    Other orders  -     TDAP 10-64Y (ADACEL,BOOSTRIX); Future     Answers submitted by the patient for this visit:  Patient Health Questionnaire (Submitted on 2023)  If you checked off any problems, how difficult have these problems made it for you to do your work, take care of things at home, or get along with other people?: Not difficult at all  PHQ9 TOTAL SCORE: 0

## 2023-08-23 NOTE — PATIENT INSTRUCTIONS
Back Pain During Pregnancy: Care Instructions  Overview     Back pain has many possible causes. It is often caused by problems with muscles and ligaments in your back. The extra weight during pregnancy can put stress on your back. Moving, lifting, standing, sitting, or sleeping in an awkward way also can strain your back. Back pain can also be a sign of labor. Although it may hurt a lot, back pain often improves on its own. Use good home treatment, and take care not to stress your back.  Follow-up care is a key part of your treatment and safety. Be sure to make and go to all appointments, and call your doctor if you are having problems. It's also a good idea to know your test results and keep a list of the medicines you take.  How can you care for yourself at home?  Ask your doctor about taking acetaminophen (Tylenol) for pain. Do not take aspirin, ibuprofen (Advil, Motrin), or naproxen (Aleve).  Do not take two or more pain medicines at the same time unless the doctor told you to. Many pain medicines have acetaminophen, which is Tylenol. Too much acetaminophen (Tylenol) can be harmful.  Lie on your side with your knees and hips bent and a pillow between your legs. This reduces stress on your back.  Put ice or cold packs on your back for 10 to 20 minutes at a time, several times a day. Put a thin cloth between the ice and your skin.  Warm baths may also help reduce pain.  Change positions every 30 minutes. Take breaks if you must sit for a long time. Get up and walk around.  Ask your doctor about how much exercise you can do. You may feel better taking short walks or doing gentle movements and stretching in a swimming pool.  Ask your doctor about exercises to stretch and strengthen your back.  When should you call for help?   Call your doctor now or seek immediate medical care if:    You think you are in labor.     You have new numbness in your buttocks, genital or rectal areas, or legs.     You have a new loss of  "bowel or bladder control.   Watch closely for changes in your health, and be sure to contact your doctor if:    You do not get better as expected.   Where can you learn more?  Go to https://www.[employer].net/patiented  Enter C696 in the search box to learn more about \"Back Pain During Pregnancy: Care Instructions.\"  Current as of: 2022               Content Version: 13.7    0587-1476 [employer].   Care instructions adapted under license by your healthcare professional. If you have questions about a medical condition or this instruction, always ask your healthcare professional. [employer] disclaims any warranty or liability for your use of this information.       Labor: Care Instructions  Overview      labor is the start of labor between 20 and 36 weeks of pregnancy. Most babies are born at 37 to 42 weeks of pregnancy. In labor, the uterus contracts to open the cervix. This is the first stage of childbirth.  labor can be caused by a problem with the baby, the mother, or both. Often the cause is not known.  In some cases, doctors use medicines to try to delay labor until 34 or more weeks of pregnancy. By this time, a baby has grown enough so that problems are not likely. In some cases--such as with a serious infection--it is healthier for the baby to be born early. Your treatment will depend on how far along you are in your pregnancy and on your health and your baby's health.  Follow-up care is a key part of your treatment and safety. Be sure to make and go to all appointments, and call your doctor if you are having problems. It's also a good idea to know your test results and keep a list of the medicines you take.  How can you care for yourself at home?  If your doctor prescribed medicines, take them exactly as directed. Call your doctor if you think you are having a problem with your medicine.  Rest until your doctor advises you about activity.  Do not " "have sexual intercourse unless your doctor says it is safe.  Use sanitary pads if you have vaginal bleeding. Using pads makes it easier to monitor your bleeding.  Do not smoke or allow others to smoke around you. If you need help quitting, talk to your doctor about stop-smoking programs and medicines. These can increase your chances of quitting for good.  When should you call for help?   Call 911  anytime you think you may need emergency care. For example, call if:    You passed out (lost consciousness).     You have a seizure.     You have severe vaginal bleeding.     You have severe pain in your belly or pelvis that doesn't get better between contractions.     You have had fluid gushing or leaking from your vagina and you know or think the umbilical cord is bulging into your vagina. If this happens, immediately get down on your knees so your rear end (buttocks) is higher than your head. This will decrease the pressure on the cord until help arrives.   Call your doctor now or seek immediate medical care if:    You have signs of preeclampsia, such as:  Sudden swelling of your face, hands, or feet.  New vision problems (such as dimness, blurring, or seeing spots).  A severe headache.     You have any vaginal bleeding.     You have belly pain or cramping.     You have a fever.     You have had regular contractions (with or without pain) for an hour. This means that you have 6 or more within 1 hour after you change your position and drink fluids.     You have a sudden release of fluid from the vagina.     You have low back pain or pelvic pressure that does not go away.     You notice that your baby has stopped moving or is moving much less than normal.   Watch closely for changes in your health, and be sure to contact your doctor if you have any problems.  Where can you learn more?  Go to https://www.healthwise.net/patiented  Enter Q400 in the search box to learn more about \" Labor: Care Instructions.\"  Current " as of: 2022               Content Version: 13.7    4840-0815 [employer].   Care instructions adapted under license by your healthcare professional. If you have questions about a medical condition or this instruction, always ask your healthcare professional. [employer] disclaims any warranty or liability for your use of this information.      Weeks 26 to 30 of Your Pregnancy: Care Instructions  You're starting your last trimester. You'll probably feel your baby moving around more. Your back may ache as your body gets used to your baby's size and length. Take care of yourself, and pay attention to what your body needs.    Talk to your doctor about getting the Tdap shot. It will help protect your  against whooping cough (pertussis). Also ask your doctor about flu and COVID-19 shots if you haven't had them yet. If your blood type is Rh negative, you may be given a shot of Rh immune globulin (such as RhoGAM). It can help prevent problems for your baby.   You may have Duplin-Tesfaye contractions. They are single or several strong contractions without a pattern. These are practice contractions but not the start of labor.   Be kind to yourself.     Take breaks when you're tired.  Change positions often. Don't sit for too long or stand for too long.  At work, rest during breaks if you can. If you don't get breaks, talk to your doctor about writing a letter to your employer to request them.  Avoid fumes, chemicals, and tobacco smoke.  Be sexual if you want to.     You may be interested in sex, or you may not. Everyone is different.  Sex is okay unless your doctor tells you not to.  Your belly can make it hard to find good positions for sex. Gomer and explore.  Watch for signs of  labor.    These signs include:   Menstrual-like cramps. Or you may have pain or pressure in your pelvis that happens in a pattern.  About 6 or more contractions in an hour (even after rest and  "a glass of water).  A low, dull backache that doesn't go away when you change positions.  An increase or change in vaginal discharge.  Light vaginal bleeding or spotting.  Your water breaking.  Know what to do if you think you are having contractions.     Drink 1 or 2 glasses of water.  Lie down on your left side for at least an hour.  While on your side, feel the top of your belly to see if it's tight.  Write down your contractions for an hour. Time how long it is from the start of one contraction to the start of the next.  Call your doctor if you have regular contractions.  Follow-up care is a key part of your treatment and safety. Be sure to make and go to all appointments, and call your doctor if you are having problems. It's also a good idea to know your test results and keep a list of the medicines you take.  Where can you learn more?  Go to https://www.[employer].net/patiented  Enter S999 in the search box to learn more about \"Weeks 26 to 30 of Your Pregnancy: Care Instructions.\"  Current as of: November 9, 2022               Content Version: 13.7    4120-2332 [employer].   Care instructions adapted under license by your healthcare professional. If you have questions about a medical condition or this instruction, always ask your healthcare professional. [employer] disclaims any warranty or liability for your use of this information.      Counting Your Baby's Movements   Counting your unborn baby's movements will assure you of your baby's health.   The best time to count is when your baby is most active. Do it at the same time every day.  To keep track of your baby's movements, use the attached chart. Bring the chart with you to each clinic visit.  Do not smoke for at least 2 hours before counting your baby's movements. (In fact, it's best to quit smoking if you're pregnant.)  Lie on your side. Put your hand on your baby.  Write the time you start counting movements.  Count the next " 10 kicks, rolls, and other movements.  Write the time when your baby has finished 10 movements.  If you reach 10 movements within 2 hours, you're done for the day.  Call your care provider right away if:  You feel no movement for the first hour.  It takes more than 2 hours to feel 10 movements.  There's a change in the normal pattern of movements.  Your care provider's phone number is:   ________________________________________  The number to your [facility] birth center is:   ________________________________________     For informational purposes only. Not to replace the advice of your health care provider. Copyright   1991, 2005 [facility]. All rights reserved. Clinically reviewed by Simi Dickson RN, [facility]. [email or web address] [id number] - REV 10/21.

## 2023-09-14 ASSESSMENT — PATIENT HEALTH QUESTIONNAIRE - PHQ9
10. IF YOU CHECKED OFF ANY PROBLEMS, HOW DIFFICULT HAVE THESE PROBLEMS MADE IT FOR YOU TO DO YOUR WORK, TAKE CARE OF THINGS AT HOME, OR GET ALONG WITH OTHER PEOPLE: NOT DIFFICULT AT ALL
SUM OF ALL RESPONSES TO PHQ QUESTIONS 1-9: 0
SUM OF ALL RESPONSES TO PHQ QUESTIONS 1-9: 0

## 2023-09-15 ENCOUNTER — OFFICE VISIT (OUTPATIENT)
Dept: FAMILY MEDICINE | Facility: CLINIC | Age: 33
End: 2023-09-15
Payer: COMMERCIAL

## 2023-09-15 VITALS
RESPIRATION RATE: 12 BRPM | OXYGEN SATURATION: 97 % | SYSTOLIC BLOOD PRESSURE: 120 MMHG | HEART RATE: 93 BPM | HEIGHT: 63 IN | TEMPERATURE: 97.3 F | WEIGHT: 146.7 LBS | BODY MASS INDEX: 25.99 KG/M2 | DIASTOLIC BLOOD PRESSURE: 78 MMHG

## 2023-09-15 DIAGNOSIS — F41.1 GENERALIZED ANXIETY DISORDER: ICD-10-CM

## 2023-09-15 DIAGNOSIS — O09.529 SUPERVISION OF HIGH-RISK PREGNANCY OF ELDERLY MULTIGRAVIDA: Primary | ICD-10-CM

## 2023-09-15 LAB
GLUCOSE 1H P 50 G GLC PO SERPL-MCNC: 92 MG/DL (ref 70–129)
HGB BLD-MCNC: 12 G/DL (ref 11.7–15.7)

## 2023-09-15 PROCEDURE — 86780 TREPONEMA PALLIDUM: CPT | Performed by: FAMILY MEDICINE

## 2023-09-15 PROCEDURE — 36415 COLL VENOUS BLD VENIPUNCTURE: CPT | Performed by: FAMILY MEDICINE

## 2023-09-15 PROCEDURE — 85018 HEMOGLOBIN: CPT | Performed by: FAMILY MEDICINE

## 2023-09-15 PROCEDURE — 99207 PR PRENATAL VISIT: CPT | Performed by: FAMILY MEDICINE

## 2023-09-15 PROCEDURE — 82950 GLUCOSE TEST: CPT | Performed by: FAMILY MEDICINE

## 2023-09-15 RX ORDER — MULTIVITAMIN WITH IRON
1 TABLET ORAL DAILY
Status: ON HOLD | COMMUNITY
End: 2023-12-04

## 2023-09-15 NOTE — PROGRESS NOTES
Answers submitted by the patient for this visit:  Patient Health Questionnaire (Submitted on 2023)  If you checked off any problems, how difficult have these problems made it for you to do your work, take care of things at home, or get along with other people?: Not difficult at all  PHQ9 TOTAL SCORE: 0  Doing very well , taking her  Prenatal vitamins   Warning signs discussed for  labor   Passed her 1 hr GTT 92 HEMOGLOBIN -12  Appropriate  wt gain  Invitae Normal -GIRL  TOLAC- consent signed   H/O C section for TWINS    Follow up 4wk for routine prenatal care as well as 1 hr GTT      Grecia was seen today for prenatal care.    Diagnoses and all orders for this visit:    Supervision of high-risk pregnancy of elderly multigravida  Comments:  TOLAC consent signed td  Orders:  -     Treponema Abs w Reflex to RPR and Titer  -     Hemoglobin  -     Glucose tolerance gest screen 1 hour    Generalized anxiety disorder  Comments:  stable on lexapro    Other orders  -     TDAP 10-64Y (ADACEL,BOOSTRIX)     Answers submitted by the patient for this visit:  Patient Health Questionnaire (Submitted on 2023)  If you checked off any problems, how difficult have these problems made it for you to do your work, take care of things at home, or get along with other people?: Not difficult at all  PHQ9 TOTAL SCORE: 0

## 2023-09-16 LAB — T PALLIDUM AB SER QL: NONREACTIVE

## 2023-09-29 ENCOUNTER — OFFICE VISIT (OUTPATIENT)
Dept: FAMILY MEDICINE | Facility: CLINIC | Age: 33
End: 2023-09-29
Payer: COMMERCIAL

## 2023-09-29 VITALS
HEART RATE: 83 BPM | DIASTOLIC BLOOD PRESSURE: 78 MMHG | TEMPERATURE: 98.2 F | BODY MASS INDEX: 25.7 KG/M2 | OXYGEN SATURATION: 97 % | HEIGHT: 64 IN | SYSTOLIC BLOOD PRESSURE: 122 MMHG | WEIGHT: 150.5 LBS

## 2023-09-29 DIAGNOSIS — O09.529 SUPERVISION OF HIGH-RISK PREGNANCY OF ELDERLY MULTIGRAVIDA: Primary | ICD-10-CM

## 2023-09-29 DIAGNOSIS — Z98.891 H/O CESAREAN SECTION: ICD-10-CM

## 2023-09-29 PROCEDURE — 90715 TDAP VACCINE 7 YRS/> IM: CPT | Performed by: FAMILY MEDICINE

## 2023-09-29 PROCEDURE — 99207 PR PRENATAL VISIT: CPT | Performed by: FAMILY MEDICINE

## 2023-09-29 PROCEDURE — 90471 IMMUNIZATION ADMIN: CPT | Performed by: FAMILY MEDICINE

## 2023-09-29 NOTE — PROGRESS NOTES
Answers submitted by the patient for this visit:  Patient Health Questionnaire (Submitted on 2023)  If you checked off any problems, how difficult have these problems made it for you to do your work, take care of things at home, or get along with other people?: Not difficult at all  PHQ9 TOTAL SCORE: 0  Doing very well , taking her  Prenatal vitamins   Warning signs discussed for  labor   Passed her 1 hr GTT 92 HEMOGLOBIN -12  35 lb weight gain  TOLAC- consent signed   H/O C section for TWINS    Follow up 4wk for routine prenatal care as well as 1 hr GTT      Grecia was seen today for prenatal care.    Diagnoses and all orders for this visit:    Supervision of high-risk pregnancy of elderly multigravida    H/O  section    Other orders  -     INFLUENZA VACCINE IM > 6 MONTHS VALENT IIV4 (AFLURIA/FLUZONE); Future  -     COVID-19 12+ () (PFIZER); Future     Answers submitted by the patient for this visit:  Patient Health Questionnaire (Submitted on 2023)  If you checked off any problems, how difficult have these problems made it for you to do your work, take care of things at home, or get along with other people?: Not difficult at all  PHQ9 TOTAL SCORE: 0

## 2023-10-13 ENCOUNTER — OFFICE VISIT (OUTPATIENT)
Dept: FAMILY MEDICINE | Facility: CLINIC | Age: 33
End: 2023-10-13
Payer: COMMERCIAL

## 2023-10-13 VITALS
TEMPERATURE: 97.5 F | WEIGHT: 151.2 LBS | BODY MASS INDEX: 25.81 KG/M2 | OXYGEN SATURATION: 98 % | DIASTOLIC BLOOD PRESSURE: 72 MMHG | SYSTOLIC BLOOD PRESSURE: 100 MMHG | HEART RATE: 86 BPM | HEIGHT: 64 IN | RESPIRATION RATE: 12 BRPM

## 2023-10-13 DIAGNOSIS — Z98.891 H/O CESAREAN SECTION: ICD-10-CM

## 2023-10-13 DIAGNOSIS — F32.0 CURRENT MILD EPISODE OF MAJOR DEPRESSIVE DISORDER WITHOUT PRIOR EPISODE (H): ICD-10-CM

## 2023-10-13 DIAGNOSIS — O09.529 SUPERVISION OF HIGH-RISK PREGNANCY OF ELDERLY MULTIGRAVIDA: Primary | ICD-10-CM

## 2023-10-13 PROCEDURE — 99207 PR PRENATAL VISIT: CPT | Performed by: FAMILY MEDICINE

## 2023-10-13 NOTE — PROGRESS NOTES
Answers submitted by the patient for this visit:  Patient Health Questionnaire (Submitted on 2023)  If you checked off any problems, how difficult have these problems made it for you to do your work, take care of things at home, or get along with other people?: Not difficult at all  PHQ9 TOTAL SCORE: 0  Doing very well , taking her  Prenatal vitamins   Warning signs discussed for  labor   36 lb weight gain, will do BPP and EFW at 36 wk   TOLAC- consent signed   H/O C section for TWINS    Follow up 4wk for routine prenatal care as well as 1 hr GTT      Grecia was seen today for prenatal care.    Diagnoses and all orders for this visit:    Supervision of high-risk pregnancy of elderly multigravida  -     US OB Biophys Single Gestation Measure; Future    H/O  section  -     US OB Biophys Single Gestation Measure; Future    Current mild episode of major depressive disorder without prior episode (H24)  -     US OB Biophys Single Gestation Measure; Future     Answers submitted by the patient for this visit:  Patient Health Questionnaire (Submitted on 2023)  If you checked off any problems, how difficult have these problems made it for you to do your work, take care of things at home, or get along with other people?: Not difficult at all  PHQ9 TOTAL SCORE: 0

## 2023-11-02 ENCOUNTER — OFFICE VISIT (OUTPATIENT)
Dept: FAMILY MEDICINE | Facility: CLINIC | Age: 33
End: 2023-11-02
Payer: COMMERCIAL

## 2023-11-02 ENCOUNTER — HOSPITAL ENCOUNTER (OUTPATIENT)
Facility: CLINIC | Age: 33
End: 2023-11-02
Admitting: FAMILY MEDICINE
Payer: COMMERCIAL

## 2023-11-02 ENCOUNTER — HOSPITAL ENCOUNTER (OUTPATIENT)
Dept: ULTRASOUND IMAGING | Facility: CLINIC | Age: 33
Discharge: HOME | End: 2023-11-02
Attending: FAMILY MEDICINE | Admitting: FAMILY MEDICINE
Payer: COMMERCIAL

## 2023-11-02 VITALS
WEIGHT: 152.7 LBS | SYSTOLIC BLOOD PRESSURE: 111 MMHG | HEART RATE: 102 BPM | HEIGHT: 64 IN | OXYGEN SATURATION: 97 % | TEMPERATURE: 98 F | BODY MASS INDEX: 26.07 KG/M2 | DIASTOLIC BLOOD PRESSURE: 70 MMHG

## 2023-11-02 DIAGNOSIS — Z98.891 H/O CESAREAN SECTION: ICD-10-CM

## 2023-11-02 DIAGNOSIS — O09.529 SUPERVISION OF HIGH-RISK PREGNANCY OF ELDERLY MULTIGRAVIDA: ICD-10-CM

## 2023-11-02 DIAGNOSIS — O09.529 SUPERVISION OF HIGH-RISK PREGNANCY OF ELDERLY MULTIGRAVIDA: Primary | ICD-10-CM

## 2023-11-02 DIAGNOSIS — F41.1 GENERALIZED ANXIETY DISORDER: ICD-10-CM

## 2023-11-02 DIAGNOSIS — F32.0 CURRENT MILD EPISODE OF MAJOR DEPRESSIVE DISORDER WITHOUT PRIOR EPISODE (H): ICD-10-CM

## 2023-11-02 PROCEDURE — 76819 FETAL BIOPHYS PROFIL W/O NST: CPT

## 2023-11-02 PROCEDURE — 99207 PR PRENATAL VISIT: CPT | Performed by: FAMILY MEDICINE

## 2023-11-02 PROCEDURE — 76816 OB US FOLLOW-UP PER FETUS: CPT

## 2023-11-02 PROCEDURE — 87653 STREP B DNA AMP PROBE: CPT | Performed by: FAMILY MEDICINE

## 2023-11-02 ASSESSMENT — PAIN SCALES - GENERAL: PAINLEVEL: NO PAIN (0)

## 2023-11-02 NOTE — PROGRESS NOTES
Answers submitted by the patient for this visit:  Patient Health Questionnaire (Submitted on 2023)  If you checked off any problems, how difficult have these problems made it for you to do your work, take care of things at home, or get along with other people?: Not difficult at all  PHQ9 TOTAL SCORE: 0  Doing very well , taking her  Prenatal vitamins   Warning signs discussed for  labor   36 lb weight gain,  normal  BPP ()and EFW ( 2360gm) done 23   TOLAC- consent signed   H/O C section for TWINS  GBS done   Flu shot done at work, like to wait on COVID  Follow up every wk till delivery       Grecia was seen today for prenatal care.    Diagnoses and all orders for this visit:    Supervision of high-risk pregnancy of elderly multigravida  -     Group B strep PCR    Current mild episode of major depressive disorder without prior episode (H24)    Generalized anxiety disorder     Answers submitted by the patient for this visit:  Patient Health Questionnaire (Submitted on 2023)  If you checked off any problems, how difficult have these problems made it for you to do your work, take care of things at home, or get along with other people?: Not difficult at all  PHQ9 TOTAL SCORE: 0  Answers submitted by the patient for this visit:  General Questionnaire (Submitted on 10/26/2023)  Chief Complaint: Chronic problems general questions HPI Form  What is the reason for your visit today? : Pregnancy  How many servings of fruits and vegetables do you eat daily?: 0-1  On average, how many sweetened beverages do you drink each day (Examples: soda, juice, sweet tea, etc.  Do NOT count diet or artificially sweetened beverages)?: 2  How many minutes a day do you exercise enough to make your heart beat faster?: 9 or less  How many days a week do you exercise enough to make your heart beat faster?: 3 or less  How many days per week do you miss taking your medication?: 0

## 2023-11-03 LAB — GP B STREP DNA SPEC QL NAA+PROBE: NEGATIVE

## 2023-11-04 ENCOUNTER — MYC MEDICAL ADVICE (OUTPATIENT)
Dept: FAMILY MEDICINE | Facility: CLINIC | Age: 33
End: 2023-11-04
Payer: COMMERCIAL

## 2023-11-10 ENCOUNTER — NURSE TRIAGE (OUTPATIENT)
Dept: NURSING | Facility: CLINIC | Age: 33
End: 2023-11-10

## 2023-11-10 NOTE — TELEPHONE ENCOUNTER
"Writer called pt, relayed communication and recommendations from PCP. Pt in agreement with plan. Will call back if any other questions or concerns.    \"If no other symptoms of preeclampsia which include headaches high blood pressure swelling of hand and feet and right upper quadrant pain  besides visual changes which is one of the symptom   Then I will just watch the symptom for now as it already improved.  Complete eye exam can be done to make sure there is no swelling of the optic nerve.  And if access to blood pressure monitoring I will recommend checking the blood pressure more closely     Please adv to keep track of her symptoms and let us know    NST and BPP can be done again if needed      Sherrie Floyd MD\"    Kayla Wise RN    "

## 2023-11-10 NOTE — TELEPHONE ENCOUNTER
"OB Triage Call    Weird symptoms- eye sight issues    Is patient's OB/Midwife with the [facility] or [facility]? [facility]- Is patient's primary OB a Midwife? No- Proceed with triage.     Reason for call: Patient had a sudden change in her vision today that has been slowly improving but still present    Assessment: diagnosed with COVID a week ago on Saturday- feeling better  Indicates her vision changed abruptly today- began about 30 min ago abruptly- had holes in her vision.   Describes this change as \"I would look at my kids and I wouldn't be able to see one of their arms, or I would look at their face and couldn't see on of their eyes.\"  - was just getting the room ready for nap, was cleaning up and turned out the lights   Mostly present in her right eye but difficult to say for sure  Was not painful    Current state: Indicates that her vision seems to have improved- indicates now the visual disturbance is more wavy and the blank spots have resolved.    Baby is moving normally  Denies additional symptoms  No new hand or face swelling today, no headache, no abdominal pain  Indicates she hadn't eaten much this am- thought maybe attributed to her blood sugar.    Taking prenatal, mag (250 mg), vitamin D (2000 international unit(s)), Lexapro 10 mg- all daily  Wears glasses but has not been wearing this week as she has been off work - small corrective prescription and a prism.     Plan: Patient would like to get her Providers advice on these symptoms. Advised that a message will be sent to her OBGYN for additional advice on these symptoms and if eval is necessary.     Patient plans to deliver at [facility]    Patient's primary OB Provider is [facility].    Per protocol recommendations Patient requesting message to PCP      Is patient's delivering hospital on divert? Does not apply due to Provider will contact patient to develop plan of care      36w1d    Estimated Date of Delivery: Dec 7, 2023        OB History    " "Para Term  AB Living   2 1 1 0 1 2   SAB IAB Ectopic Multiple Live Births   0 0 1 2 2      # Outcome Date GA Lbr Justice/2nd Weight Sex Delivery Anes PTL Lv   2 Current            1A Ectopic 22 38w2d  2.48 kg (5 lb 7.5 oz) M CS-Unspec   KAYLEY      Birth Comments: scheduled C section for twins      Name: Berhane      Apgar1: 8  Apgar5: 9   1B Term 22 38w2d  2.722 kg (6 lb) M CS-Unspec   KAYLEY      Name: Praveen      Apgar1: 7  Apgar5: 9       No results found for: \"GBS\"       Tatum Christianson RN 11/10/23 11:01 AM  [facility] Nurse Advisor  Reason for Disposition   Blurred vision or visual changes and present now and sudden onset or new (e.g., minutes, hours, days)    Additional Information   Negative: Followed getting substance in the eye   Negative: Foreign body stuck in the eye   Negative: Followed an eye injury   Negative: Followed sun lamp or sun exposure (UV keratitis)   Negative: Yellow or green discharge (pus) in the eye   Negative: SEVERE headache   Negative: SEVERE eye pain   Negative: Complete loss of vision in one or both eyes   Negative: Pregnant 20 or more weeks and new hand or face swelling   Negative: Pregnant 20 or more weeks and upper abdominal pain lasts > 1 hour   Negative: Pregnant 20 or more weeks and headache   Negative: Pregnant 20 or more weeks and sudden weight gain (i.e., more than 3 lbs or 1.4 kg in one week)   Negative: Pregnant 20 or more weeks and Systolic BP >= 140 OR Diastolic BP >= 90   Negative: Pregnant 23 or more weeks and baby is moving less today (e.g., kick count < 5 in 1 hour or < 10 in 2 hours)   Negative: Double vision   Negative: Many floaters in the eye   Negative: Flashes of light  (Exception: Brief from pressing on the eyeball.)    Protocols used: Pregnancy - Vision Loss or Change-A-OH    "

## 2023-11-10 NOTE — CONFIDENTIAL NOTE
If no other symptoms of preeclampsia which include headaches high blood pressure swelling of hand and feet and right upper quadrant pain  besides visual changes which is one of the symptom   Then I will just watch the symptom for now as it already improved.  Complete eye exam can be done to make sure there is no swelling of the optic nerve.  And if access to blood pressure monitoring I will recommend checking the blood pressure more closely    Please adv to keep track of her symptoms and let us know    NST and BPP can be done again if needed     Sherrie Floyd MD

## 2023-11-16 ENCOUNTER — OFFICE VISIT (OUTPATIENT)
Dept: FAMILY MEDICINE | Facility: CLINIC | Age: 33
End: 2023-11-16
Payer: COMMERCIAL

## 2023-11-16 VITALS
SYSTOLIC BLOOD PRESSURE: 104 MMHG | WEIGHT: 153 LBS | DIASTOLIC BLOOD PRESSURE: 70 MMHG | TEMPERATURE: 97.1 F | HEART RATE: 85 BPM | OXYGEN SATURATION: 97 % | BODY MASS INDEX: 26.06 KG/M2

## 2023-11-16 DIAGNOSIS — O09.529 SUPERVISION OF HIGH-RISK PREGNANCY OF ELDERLY MULTIGRAVIDA: Primary | ICD-10-CM

## 2023-11-16 DIAGNOSIS — Z98.891 H/O CESAREAN SECTION: ICD-10-CM

## 2023-11-16 DIAGNOSIS — U07.1 INFECTION DUE TO 2019-NCOV: ICD-10-CM

## 2023-11-16 PROCEDURE — 59025 FETAL NON-STRESS TEST: CPT | Performed by: FAMILY MEDICINE

## 2023-11-16 PROCEDURE — 99207 PR PRENATAL VISIT: CPT | Performed by: FAMILY MEDICINE

## 2023-11-16 NOTE — LETTER
My Depression Action Plan  Name: Grecia Post   Date of Birth 1990  Date: 11/16/2023    My doctor: Sherrie Floyd   My clinic: [address] [address] [phone]            GREEN    ZONE   Good Control    What it looks like:   Things are going generally well. You have normal ups and downs. You may even feel depressed from time to time, but bad moods usually last less than a day.   What you need to do:  Continue to care for yourself (see self care plan)  Check your depression survival kit and update it as needed  Follow your physician s recommendations including any medication.  Do not stop taking medication unless you consult with your physician first.             YELLOW         ZONE Getting Worse    What it looks like:   Depression is starting to interfere with your life.   It may be hard to get out of bed; you may be starting to isolate yourself from others.  Symptoms of depression are starting to last most all day and this has happened for several days.   You may have suicidal thoughts but they are not constant.   What you need to do:     Call your care team. Your response to treatment will improve if you keep your care team informed of your progress. Yellow periods are signs an adjustment may need to be made.     Continue your self-care.  Just get dressed and ready for the day.  Don't give yourself time to talk yourself out of it.    Talk to someone in your support network.    Open up your Depression Self-Care Plan/Wellness Kit.             RED    ZONE Medical Alert - Get Help    What it looks like:   Depression is seriously interfering with your life.   You may experience these or other symptoms: You can t get out of bed most days, can t work or engage in other necessary activities, you have trouble taking care of basic hygiene, or basic responsibilities, thoughts of suicide or death that will not go away, self-injurious  behavior.     What you need to do:  Call your care team and request a same-day appointment. If they are not available (weekends or after hours) call your local crisis line, emergency room or 911.          Depression Self-Care Plan / Wellness Kit    Many people find that medication and therapy are helpful treatments for managing depression. In addition, making small changes to your everyday life can help to boost your mood and improve your wellbeing. Below are some tips for you to consider. Be sure to talk with your medical provider and/or behavioral health consultant if your symptoms are worsening or not improving.     Sleep   Sleep hygiene  means all of the habits that support good, restful sleep. It includes maintaining a consistent bedtime and wake time, using your bedroom only for sleeping or sex, and keeping the bedroom dark and free of distractions like a computer, smartphone, or television.     Develop a Healthy Routine  Maintain good hygiene. Get out of bed in the morning, make your bed, brush your teeth, take a shower, and get dressed. Don t spend too much time viewing media that makes you feel stressed. Find time to relax each day.    Exercise  Get some form of exercise every day. This will help reduce pain and release endorphins, the  feel good  chemicals in your brain. It can be as simple as just going for a walk or doing some gardening, anything that will get you moving.      Diet  Strive to eat healthy foods, including fruits and vegetables. Drink plenty of water. Avoid excessive sugar, caffeine, alcohol, and other mood-altering substances.     Stay Connected with Others  Stay in touch with friends and family members.    Manage Your Mood  Try deep breathing, massage therapy, biofeedback, or meditation. Take part in fun activities when you can. Try to find something to smile about each day.     Psychotherapy  Be open to working with a therapist if your provider recommends it.     Medication  Be sure to  take your medication as prescribed. Most anti-depressants need to be taken every day. It usually takes several weeks for medications to work. Not all medicines work for all people. It is important to follow-up with your provider to make sure you have a treatment plan that is working for you. Do not stop your medication abruptly without first discussing it with your provider.    Crisis Resources   These hotlines are for both adults and children. They and are open 24 hours a day, 7 days a week unless noted otherwise.    National Suicide Prevention Lifeline   988 or 6-189-637-MEIL (1842)    Crisis Text Line    www.crisistextline.org  Text HOME to 837012 from anywhere in the [place], anytime, about any type of crisis. A live, trained crisis counselor will receive the text and respond quickly.    [employer] Lifeline for LGBTQ Youth  A national crisis intervention and suicide lifeline for LGBTQ youth under 25. Provides a safe place to talk without judgement. Call 1-102.421.7038; text START to 536851 or visit www.thetrevorproject.org to talk to a trained counselor.    For [facility] crisis numbers, visit the [facility] website at:  https://[place].gov/dhs/people-we-serve/adults/health-care/mental-health/resources/crisis-contacts.jsp

## 2023-11-16 NOTE — PROGRESS NOTES
Answers submitted by the patient for this visit:  Patient Health Questionnaire (Submitted on 2023)  If you checked off any problems, how difficult have these problems made it for you to do your work, take care of things at home, or get along with other people?: Not difficult at all  PHQ9 TOTAL SCORE: 0  Doing very well , taking her  Prenatal vitamins   Warning signs discussed for  labor. Daily fetal kick count   36 lb weight gain,  normal  BPP ()and EFW ( 2360gm) done 23   TOLAC- consent signed   H/O C section for TWINS  Rule of 5-1-1 discussed for labor - and when to go to [place]  Like to get epidural for pain   GBS NEG  Flu shot done at work, like to wait on COVID  Follow up every wk till delivery     NON STRESS TEST  ---------------------------------------------------------------------------------------------------------  NST interpretation: reactive. Test duration 15 min. Baseline  bpm.  Baseline variability: moderate. Accelerations: present. Decelerations: absent. Uterine activity:  absent. CTG category: normal/reassuring Category DARIUS Paige was seen today for prenatal care.    Diagnoses and all orders for this visit:    Supervision of high-risk pregnancy of elderly multigravida    H/O  section    H/O Infection due to 8626-fTtD-28/4/23    Other orders  -     Fetal Non Stress Test by Provider/RN     Answers submitted by the patient for this visit:  Patient Health Questionnaire (Submitted on 2023)  If you checked off any problems, how difficult have these problems made it for you to do your work, take care of things at home, or get along with other people?: Not difficult at all  PHQ9 TOTAL SCORE: 0  Answers submitted by the patient for this visit:  General Questionnaire (Submitted on 10/26/2023)  Chief Complaint: Chronic problems general questions HPI Form  What is the reason for your visit today? : Pregnancy  How many servings of fruits and vegetables do you  eat daily?: 0-1  On average, how many sweetened beverages do you drink each day (Examples: soda, juice, sweet tea, etc.  Do NOT count diet or artificially sweetened beverages)?: 2  How many minutes a day do you exercise enough to make your heart beat faster?: 9 or less  How many days a week do you exercise enough to make your heart beat faster?: 3 or less  How many days per week do you miss taking your medication?: 0

## 2023-11-17 ENCOUNTER — MYC MEDICAL ADVICE (OUTPATIENT)
Dept: FAMILY MEDICINE | Facility: CLINIC | Age: 33
End: 2023-11-17
Payer: COMMERCIAL

## 2023-11-22 ENCOUNTER — TELEPHONE (OUTPATIENT)
Dept: FAMILY MEDICINE | Facility: CLINIC | Age: 33
End: 2023-11-22

## 2023-11-22 ENCOUNTER — OFFICE VISIT (OUTPATIENT)
Dept: FAMILY MEDICINE | Facility: CLINIC | Age: 33
End: 2023-11-22
Payer: COMMERCIAL

## 2023-11-22 ENCOUNTER — MYC MEDICAL ADVICE (OUTPATIENT)
Dept: FAMILY MEDICINE | Facility: CLINIC | Age: 33
End: 2023-11-22

## 2023-11-22 VITALS
OXYGEN SATURATION: 97 % | WEIGHT: 155.7 LBS | TEMPERATURE: 97.8 F | BODY MASS INDEX: 26.58 KG/M2 | DIASTOLIC BLOOD PRESSURE: 71 MMHG | SYSTOLIC BLOOD PRESSURE: 119 MMHG | HEART RATE: 86 BPM | HEIGHT: 64 IN

## 2023-11-22 DIAGNOSIS — O09.529 SUPERVISION OF HIGH-RISK PREGNANCY OF ELDERLY MULTIGRAVIDA: Primary | ICD-10-CM

## 2023-11-22 DIAGNOSIS — Z98.891 H/O CESAREAN SECTION: ICD-10-CM

## 2023-11-22 DIAGNOSIS — F41.1 GENERALIZED ANXIETY DISORDER: ICD-10-CM

## 2023-11-22 PROCEDURE — 99207 PR PRENATAL VISIT: CPT | Performed by: FAMILY MEDICINE

## 2023-11-22 PROCEDURE — 59025 FETAL NON-STRESS TEST: CPT | Performed by: FAMILY MEDICINE

## 2023-11-22 NOTE — TELEPHONE ENCOUNTER
11/21  General Call      Reason for Call:  Reschedule induction    What are your questions or concerns:  Pt states she was just in with [facility] and scheduled an induction for 11/30. Instead of 11/30, pt wants her induction to be 12/1. Please change and call back pt.    Date of last appointment with provider: 11/22/23    Could we send this information to you in [employer] or would you prefer to receive a phone call?:   Patient would prefer a phone call   Okay to leave a detailed message?: Yes at Cell number on file:    Telephone Information:   Mobile [phone]

## 2023-11-22 NOTE — PROGRESS NOTES
Answers submitted by the patient for this visit:  Doing very well , taking her  Prenatal vitamins   Warning signs discussed for  labor. Daily fetal kick count   36 lb weight gain,  normal  BPP ()and EFW ( 2360gm) done 23   TOLAC- consent signed   H/O C section for TWINS  Rule of 5-1-1 [facility] for labor [facility]  Like to get epidural for pain   GBS NEG  Plan elective induction on 23- TOLAC with pitocin  [place] today 3  Follow up every wk till delivery     NON STRESS TEST  ---------------------------------------------------------------------------------------------------------  NST interpretation: reactive. Test duration 15 min. Baseline  bpm.  Baseline variability: moderate. Accelerations: present. Decelerations: absent. Uterine activity:  absent. CTG category: normal/reassuring Category I         Grecia was seen today for prenatal care.    Diagnoses and all orders for this visit:    Supervision of high-risk pregnancy of elderly multigravida  -     Fetal Non Stress Test by Provider/RN    H/O  section    Generalized anxiety disorder  -     Fetal Non Stress Test by Provider/RN

## 2023-11-29 ENCOUNTER — OFFICE VISIT (OUTPATIENT)
Dept: FAMILY MEDICINE | Facility: CLINIC | Age: 33
End: 2023-11-29
Payer: COMMERCIAL

## 2023-11-29 VITALS
BODY MASS INDEX: 26.34 KG/M2 | TEMPERATURE: 98.2 F | SYSTOLIC BLOOD PRESSURE: 108 MMHG | OXYGEN SATURATION: 97 % | HEIGHT: 64 IN | HEART RATE: 82 BPM | DIASTOLIC BLOOD PRESSURE: 72 MMHG | WEIGHT: 154.3 LBS

## 2023-11-29 DIAGNOSIS — Z98.891 H/O CESAREAN SECTION: ICD-10-CM

## 2023-11-29 DIAGNOSIS — F32.0 CURRENT MILD EPISODE OF MAJOR DEPRESSIVE DISORDER WITHOUT PRIOR EPISODE (H): ICD-10-CM

## 2023-11-29 DIAGNOSIS — O48.0 POST-TERM PREGNANCY, 40-42 WEEKS OF GESTATION: ICD-10-CM

## 2023-11-29 DIAGNOSIS — O09.529 SUPERVISION OF HIGH-RISK PREGNANCY OF ELDERLY MULTIGRAVIDA: Primary | ICD-10-CM

## 2023-11-29 PROCEDURE — 59025 FETAL NON-STRESS TEST: CPT | Performed by: FAMILY MEDICINE

## 2023-11-29 PROCEDURE — 99207 PR PRENATAL VISIT: CPT | Performed by: FAMILY MEDICINE

## 2023-11-29 ASSESSMENT — PAIN SCALES - GENERAL: PAINLEVEL: NO PAIN (0)

## 2023-11-29 NOTE — PROGRESS NOTES
Answers submitted by the patient for this visit:  Doing very well , taking her  Prenatal vitamins   Warning signs discussed for  labor. Daily fetal kick count   36 lb weight gain,  normal  BPP ()and EFW ( 2360gm) done 23   TOLAC- consent signed   H/O C section for TWINS  Rule of 5-1-1 discussed for labor - and when to go to [place]  Like to get epidural for pain   GBS NEG  Plan elective induction on 23 TOLAC with pitocin  BPP 23 , no bettye with me till induction patient next bettye was on 23   Patient working till 23  [place] today 3-4  Follow up every wk till delivery     NON STRESS TEST  ---------------------------------------------------------------------------------------------------------  NST interpretation: reactive. Test duration 20 min. Baseline  bpm.  Baseline variability: moderate initially ( then minimal for 10m apple juice given which improved and then had 2 accels  Accelerations: present. Decelerations: absent. Uterine activity:  absent. CTG category: normal/reassuring Category I         Grecia was seen today for prenatal care.    Diagnoses and all orders for this visit:    Supervision of high-risk pregnancy of elderly multigravida  -     Fetal Non Stress Test by Provider/RN    Current mild episode of major depressive disorder without prior episode (H24)  Comments:  continue lexpro as before    Post-term pregnancy, 40-42 weeks of gestation  Comments:  ordered BPP for next Tuesday -wed . plan induction on  changed from  per pt request  Orders:  -     US OB Biophys Single Gestation Measure; Future    H/O  section

## 2023-12-03 ENCOUNTER — HOSPITAL ENCOUNTER (INPATIENT)
Facility: CLINIC | Age: 33
LOS: 2 days | Discharge: HOME | End: 2023-12-05
Attending: FAMILY MEDICINE | Admitting: FAMILY MEDICINE
Payer: COMMERCIAL

## 2023-12-03 DIAGNOSIS — K64.4 EXTERNAL HEMORRHOIDS: Primary | ICD-10-CM

## 2023-12-03 LAB
ABO/RH(D): NORMAL
ANTIBODY SCREEN: NEGATIVE
ERYTHROCYTE [DISTWIDTH] IN BLOOD BY AUTOMATED COUNT: 13.3 % (ref 10–15)
HCT VFR BLD AUTO: 41.3 % (ref 35–47)
HGB BLD-MCNC: 13.7 G/DL (ref 11.7–15.7)
HOLD SPECIMEN: NORMAL
MCH RBC QN AUTO: 30.4 PG (ref 26.5–33)
MCHC RBC AUTO-ENTMCNC: 33.2 G/DL (ref 31.5–36.5)
MCV RBC AUTO: 92 FL (ref 78–100)
PLATELET # BLD AUTO: 209 10E3/UL (ref 150–450)
RBC # BLD AUTO: 4.51 10E6/UL (ref 3.8–5.2)
SPECIMEN EXPIRATION DATE: NORMAL
WBC # BLD AUTO: 9.8 10E3/UL (ref 4–11)

## 2023-12-03 PROCEDURE — 36415 COLL VENOUS BLD VENIPUNCTURE: CPT | Performed by: FAMILY MEDICINE

## 2023-12-03 PROCEDURE — 86780 TREPONEMA PALLIDUM: CPT | Performed by: FAMILY MEDICINE

## 2023-12-03 PROCEDURE — 120N000001 HC R&B MED SURG/OB

## 2023-12-03 PROCEDURE — 86850 RBC ANTIBODY SCREEN: CPT | Performed by: FAMILY MEDICINE

## 2023-12-03 PROCEDURE — 85027 COMPLETE CBC AUTOMATED: CPT | Performed by: FAMILY MEDICINE

## 2023-12-03 PROCEDURE — 250N000013 HC RX MED GY IP 250 OP 250 PS 637

## 2023-12-03 PROCEDURE — 86901 BLOOD TYPING SEROLOGIC RH(D): CPT | Performed by: FAMILY MEDICINE

## 2023-12-03 RX ORDER — CITRIC ACID/SODIUM CITRATE 334-500MG
30 SOLUTION, ORAL ORAL
Status: DISCONTINUED | OUTPATIENT
Start: 2023-12-03 | End: 2023-12-04 | Stop reason: HOSPADM

## 2023-12-03 RX ORDER — METOCLOPRAMIDE HYDROCHLORIDE 5 MG/ML
10 INJECTION INTRAMUSCULAR; INTRAVENOUS EVERY 6 HOURS PRN
Status: DISCONTINUED | OUTPATIENT
Start: 2023-12-03 | End: 2023-12-04 | Stop reason: HOSPADM

## 2023-12-03 RX ORDER — ONDANSETRON 2 MG/ML
4 INJECTION INTRAMUSCULAR; INTRAVENOUS EVERY 6 HOURS PRN
Status: DISCONTINUED | OUTPATIENT
Start: 2023-12-03 | End: 2023-12-04 | Stop reason: HOSPADM

## 2023-12-03 RX ORDER — OXYTOCIN 10 [USP'U]/ML
10 INJECTION, SOLUTION INTRAMUSCULAR; INTRAVENOUS
Status: DISCONTINUED | OUTPATIENT
Start: 2023-12-03 | End: 2023-12-04 | Stop reason: HOSPADM

## 2023-12-03 RX ORDER — PROCHLORPERAZINE 25 MG
25 SUPPOSITORY, RECTAL RECTAL EVERY 12 HOURS PRN
Status: DISCONTINUED | OUTPATIENT
Start: 2023-12-03 | End: 2023-12-04 | Stop reason: HOSPADM

## 2023-12-03 RX ORDER — MISOPROSTOL 200 UG/1
400 TABLET ORAL
Status: DISCONTINUED | OUTPATIENT
Start: 2023-12-03 | End: 2023-12-04 | Stop reason: HOSPADM

## 2023-12-03 RX ORDER — OXYTOCIN/0.9 % SODIUM CHLORIDE 30/500 ML
340 PLASTIC BAG, INJECTION (ML) INTRAVENOUS CONTINUOUS PRN
Status: DISCONTINUED | OUTPATIENT
Start: 2023-12-03 | End: 2023-12-04 | Stop reason: HOSPADM

## 2023-12-03 RX ORDER — METHYLERGONOVINE MALEATE 0.2 MG/ML
200 INJECTION INTRAVENOUS
Status: DISCONTINUED | OUTPATIENT
Start: 2023-12-03 | End: 2023-12-04 | Stop reason: HOSPADM

## 2023-12-03 RX ORDER — ONDANSETRON 4 MG/1
4 TABLET, ORALLY DISINTEGRATING ORAL EVERY 6 HOURS PRN
Status: DISCONTINUED | OUTPATIENT
Start: 2023-12-03 | End: 2023-12-04 | Stop reason: HOSPADM

## 2023-12-03 RX ORDER — NALOXONE HYDROCHLORIDE 0.4 MG/ML
0.2 INJECTION, SOLUTION INTRAMUSCULAR; INTRAVENOUS; SUBCUTANEOUS
Status: DISCONTINUED | OUTPATIENT
Start: 2023-12-03 | End: 2023-12-04 | Stop reason: HOSPADM

## 2023-12-03 RX ORDER — ACETAMINOPHEN 325 MG/1
650 TABLET ORAL EVERY 4 HOURS PRN
Status: DISCONTINUED | OUTPATIENT
Start: 2023-12-03 | End: 2023-12-04 | Stop reason: HOSPADM

## 2023-12-03 RX ORDER — LIDOCAINE 40 MG/G
CREAM TOPICAL
Status: DISCONTINUED | OUTPATIENT
Start: 2023-12-03 | End: 2023-12-04 | Stop reason: HOSPADM

## 2023-12-03 RX ORDER — OXYTOCIN 10 [USP'U]/ML
10 INJECTION, SOLUTION INTRAMUSCULAR; INTRAVENOUS
Status: DISCONTINUED | OUTPATIENT
Start: 2023-12-03 | End: 2023-12-05 | Stop reason: HOSPADM

## 2023-12-03 RX ORDER — KETOROLAC TROMETHAMINE 30 MG/ML
30 INJECTION, SOLUTION INTRAMUSCULAR; INTRAVENOUS
Status: DISCONTINUED | OUTPATIENT
Start: 2023-12-03 | End: 2023-12-04

## 2023-12-03 RX ORDER — SODIUM CHLORIDE, SODIUM LACTATE, POTASSIUM CHLORIDE, CALCIUM CHLORIDE 600; 310; 30; 20 MG/100ML; MG/100ML; MG/100ML; MG/100ML
INJECTION, SOLUTION INTRAVENOUS CONTINUOUS
Status: DISCONTINUED | OUTPATIENT
Start: 2023-12-03 | End: 2023-12-04 | Stop reason: HOSPADM

## 2023-12-03 RX ORDER — OXYTOCIN/0.9 % SODIUM CHLORIDE 30/500 ML
100-340 PLASTIC BAG, INJECTION (ML) INTRAVENOUS CONTINUOUS PRN
Status: DISCONTINUED | OUTPATIENT
Start: 2023-12-03 | End: 2023-12-05 | Stop reason: HOSPADM

## 2023-12-03 RX ORDER — CALCIUM CARBONATE 500 MG/1
500-1000 TABLET, CHEWABLE ORAL 3 TIMES DAILY PRN
Status: DISCONTINUED | OUTPATIENT
Start: 2023-12-03 | End: 2023-12-05 | Stop reason: HOSPADM

## 2023-12-03 RX ORDER — NALOXONE HYDROCHLORIDE 0.4 MG/ML
0.4 INJECTION, SOLUTION INTRAMUSCULAR; INTRAVENOUS; SUBCUTANEOUS
Status: DISCONTINUED | OUTPATIENT
Start: 2023-12-03 | End: 2023-12-04 | Stop reason: HOSPADM

## 2023-12-03 RX ORDER — FENTANYL CITRATE 50 UG/ML
50 INJECTION, SOLUTION INTRAMUSCULAR; INTRAVENOUS EVERY 30 MIN PRN
Status: DISCONTINUED | OUTPATIENT
Start: 2023-12-03 | End: 2023-12-04 | Stop reason: HOSPADM

## 2023-12-03 RX ORDER — CARBOPROST TROMETHAMINE 250 UG/ML
250 INJECTION, SOLUTION INTRAMUSCULAR
Status: DISCONTINUED | OUTPATIENT
Start: 2023-12-03 | End: 2023-12-04 | Stop reason: HOSPADM

## 2023-12-03 RX ORDER — MISOPROSTOL 200 UG/1
800 TABLET ORAL
Status: DISCONTINUED | OUTPATIENT
Start: 2023-12-03 | End: 2023-12-04 | Stop reason: HOSPADM

## 2023-12-03 RX ORDER — PROCHLORPERAZINE MALEATE 10 MG
10 TABLET ORAL EVERY 6 HOURS PRN
Status: DISCONTINUED | OUTPATIENT
Start: 2023-12-03 | End: 2023-12-04 | Stop reason: HOSPADM

## 2023-12-03 RX ORDER — TRANEXAMIC ACID 10 MG/ML
1 INJECTION, SOLUTION INTRAVENOUS EVERY 30 MIN PRN
Status: DISCONTINUED | OUTPATIENT
Start: 2023-12-03 | End: 2023-12-04 | Stop reason: HOSPADM

## 2023-12-03 RX ORDER — METOCLOPRAMIDE 10 MG/1
10 TABLET ORAL EVERY 6 HOURS PRN
Status: DISCONTINUED | OUTPATIENT
Start: 2023-12-03 | End: 2023-12-04 | Stop reason: HOSPADM

## 2023-12-03 RX ORDER — IBUPROFEN 800 MG/1
800 TABLET, FILM COATED ORAL
Status: DISCONTINUED | OUTPATIENT
Start: 2023-12-03 | End: 2023-12-04

## 2023-12-03 RX ADMIN — CALCIUM CARBONATE (ANTACID) CHEW TAB 500 MG 1000 MG: 500 CHEW TAB at 21:40

## 2023-12-03 ASSESSMENT — ACTIVITIES OF DAILY LIVING (ADL)
CHANGE_IN_FUNCTIONAL_STATUS_SINCE_ONSET_OF_CURRENT_ILLNESS/INJURY: NO
WEAR_GLASSES_OR_BLIND: YES
DIFFICULTY_EATING/SWALLOWING: NO
TOILETING_ISSUES: NO
DRESSING/BATHING_DIFFICULTY: NO
WALKING_OR_CLIMBING_STAIRS_DIFFICULTY: NO
ADLS_ACUITY_SCORE: 20
CONCENTRATING,_REMEMBERING_OR_MAKING_DECISIONS_DIFFICULTY: NO
VISION_MANAGEMENT: GLASSES
DIFFICULTY_COMMUNICATING: NO
HEARING_DIFFICULTY_OR_DEAF: NO
DOING_ERRANDS_INDEPENDENTLY_DIFFICULTY: NO
FALL_HISTORY_WITHIN_LAST_SIX_MONTHS: NO
ADLS_ACUITY_SCORE: 20

## 2023-12-04 ENCOUNTER — ANESTHESIA (OUTPATIENT)
Dept: OBGYN | Facility: CLINIC | Age: 33
End: 2023-12-04
Payer: COMMERCIAL

## 2023-12-04 ENCOUNTER — ANESTHESIA EVENT (OUTPATIENT)
Dept: OBGYN | Facility: CLINIC | Age: 33
End: 2023-12-04
Payer: COMMERCIAL

## 2023-12-04 LAB — T PALLIDUM AB SER QL: NONREACTIVE

## 2023-12-04 PROCEDURE — 0KQM0ZZ REPAIR PERINEUM MUSCLE, OPEN APPROACH: ICD-10-PCS | Performed by: FAMILY MEDICINE

## 2023-12-04 PROCEDURE — 250N000013 HC RX MED GY IP 250 OP 250 PS 637

## 2023-12-04 PROCEDURE — 250N000009 HC RX 250: Performed by: ANESTHESIOLOGY

## 2023-12-04 PROCEDURE — 722N000001 HC LABOR CARE VAGINAL DELIVERY SINGLE

## 2023-12-04 PROCEDURE — 258N000003 HC RX IP 258 OP 636: Performed by: FAMILY MEDICINE

## 2023-12-04 PROCEDURE — 120N000001 HC R&B MED SURG/OB

## 2023-12-04 PROCEDURE — 250N000011 HC RX IP 250 OP 636: Performed by: ANESTHESIOLOGY

## 2023-12-04 PROCEDURE — 00HU33Z INSERTION OF INFUSION DEVICE INTO SPINAL CANAL, PERCUTANEOUS APPROACH: ICD-10-PCS | Performed by: ANESTHESIOLOGY

## 2023-12-04 PROCEDURE — 250N000011 HC RX IP 250 OP 636: Mod: JZ | Performed by: FAMILY MEDICINE

## 2023-12-04 PROCEDURE — 250N000013 HC RX MED GY IP 250 OP 250 PS 637: Performed by: FAMILY MEDICINE

## 2023-12-04 PROCEDURE — 0UQMXZZ REPAIR VULVA, EXTERNAL APPROACH: ICD-10-PCS | Performed by: FAMILY MEDICINE

## 2023-12-04 PROCEDURE — 250N000009 HC RX 250: Performed by: FAMILY MEDICINE

## 2023-12-04 PROCEDURE — 370N000003 HC ANESTHESIA WARD SERVICE: Performed by: ANESTHESIOLOGY

## 2023-12-04 PROCEDURE — 258N000003 HC RX IP 258 OP 636: Performed by: ANESTHESIOLOGY

## 2023-12-04 PROCEDURE — 3E0R3BZ INTRODUCTION OF ANESTHETIC AGENT INTO SPINAL CANAL, PERCUTANEOUS APPROACH: ICD-10-PCS | Performed by: ANESTHESIOLOGY

## 2023-12-04 RX ORDER — LIDOCAINE 40 MG/G
CREAM TOPICAL
Status: DISCONTINUED | OUTPATIENT
Start: 2023-12-04 | End: 2023-12-04 | Stop reason: HOSPADM

## 2023-12-04 RX ORDER — EPHEDRINE SULFATE 50 MG/ML
5 INJECTION, SOLUTION INTRAMUSCULAR; INTRAVENOUS; SUBCUTANEOUS
Status: DISCONTINUED | OUTPATIENT
Start: 2023-12-04 | End: 2023-12-05 | Stop reason: HOSPADM

## 2023-12-04 RX ORDER — IBUPROFEN 800 MG/1
800 TABLET, FILM COATED ORAL EVERY 6 HOURS PRN
Status: DISCONTINUED | OUTPATIENT
Start: 2023-12-04 | End: 2023-12-05 | Stop reason: HOSPADM

## 2023-12-04 RX ORDER — HYDROCORTISONE 25 MG/G
CREAM TOPICAL 3 TIMES DAILY PRN
Status: DISCONTINUED | OUTPATIENT
Start: 2023-12-04 | End: 2023-12-05 | Stop reason: HOSPADM

## 2023-12-04 RX ORDER — ESCITALOPRAM OXALATE 10 MG/1
10 TABLET ORAL DAILY
COMMUNITY
End: 2024-01-15

## 2023-12-04 RX ORDER — NALBUPHINE HYDROCHLORIDE 20 MG/ML
2.5-5 INJECTION, SOLUTION INTRAMUSCULAR; INTRAVENOUS; SUBCUTANEOUS EVERY 6 HOURS PRN
Status: DISCONTINUED | OUTPATIENT
Start: 2023-12-04 | End: 2023-12-05 | Stop reason: HOSPADM

## 2023-12-04 RX ORDER — FENTANYL CITRATE-0.9 % NACL/PF 10 MCG/ML
100 PLASTIC BAG, INJECTION (ML) INTRAVENOUS EVERY 5 MIN PRN
Status: DISCONTINUED | OUTPATIENT
Start: 2023-12-04 | End: 2023-12-05 | Stop reason: HOSPADM

## 2023-12-04 RX ORDER — MODIFIED LANOLIN
OINTMENT (GRAM) TOPICAL
Status: DISCONTINUED | OUTPATIENT
Start: 2023-12-04 | End: 2023-12-05 | Stop reason: HOSPADM

## 2023-12-04 RX ORDER — OXYTOCIN/0.9 % SODIUM CHLORIDE 30/500 ML
1-24 PLASTIC BAG, INJECTION (ML) INTRAVENOUS CONTINUOUS
Status: DISCONTINUED | OUTPATIENT
Start: 2023-12-04 | End: 2023-12-04 | Stop reason: HOSPADM

## 2023-12-04 RX ORDER — FENTANYL/ROPIVACAINE/NS/PF 2MCG/ML-.1
PLASTIC BAG, INJECTION (ML) EPIDURAL
Status: DISCONTINUED | OUTPATIENT
Start: 2023-12-04 | End: 2023-12-04

## 2023-12-04 RX ORDER — FENTANYL/ROPIVACAINE/NS/PF 2MCG/ML-.1
PLASTIC BAG, INJECTION (ML) EPIDURAL
Status: DISCONTINUED | OUTPATIENT
Start: 2023-12-04 | End: 2023-12-04 | Stop reason: HOSPADM

## 2023-12-04 RX ORDER — FENTANYL CITRATE-0.9 % NACL/PF 10 MCG/ML
100 PLASTIC BAG, INJECTION (ML) INTRAVENOUS ONCE
Status: DISCONTINUED | OUTPATIENT
Start: 2023-12-04 | End: 2023-12-05 | Stop reason: HOSPADM

## 2023-12-04 RX ORDER — EPHEDRINE SULFATE 50 MG/ML
5 INJECTION, SOLUTION INTRAMUSCULAR; INTRAVENOUS; SUBCUTANEOUS ONCE
Status: DISCONTINUED | OUTPATIENT
Start: 2023-12-04 | End: 2023-12-04

## 2023-12-04 RX ORDER — DOCUSATE SODIUM 100 MG/1
100 CAPSULE, LIQUID FILLED ORAL DAILY
Status: DISCONTINUED | OUTPATIENT
Start: 2023-12-04 | End: 2023-12-05 | Stop reason: HOSPADM

## 2023-12-04 RX ORDER — ONDANSETRON 4 MG/1
4 TABLET, ORALLY DISINTEGRATING ORAL EVERY 6 HOURS PRN
Status: DISCONTINUED | OUTPATIENT
Start: 2023-12-04 | End: 2023-12-04 | Stop reason: HOSPADM

## 2023-12-04 RX ORDER — SODIUM CHLORIDE, SODIUM LACTATE, POTASSIUM CHLORIDE, CALCIUM CHLORIDE 600; 310; 30; 20 MG/100ML; MG/100ML; MG/100ML; MG/100ML
INJECTION, SOLUTION INTRAVENOUS CONTINUOUS PRN
Status: DISCONTINUED | OUTPATIENT
Start: 2023-12-04 | End: 2023-12-04 | Stop reason: HOSPADM

## 2023-12-04 RX ORDER — BISACODYL 10 MG
10 SUPPOSITORY, RECTAL RECTAL DAILY PRN
Status: DISCONTINUED | OUTPATIENT
Start: 2023-12-04 | End: 2023-12-05 | Stop reason: HOSPADM

## 2023-12-04 RX ORDER — FENTANYL CITRATE-0.9 % NACL/PF 10 MCG/ML
100 PLASTIC BAG, INJECTION (ML) INTRAVENOUS EVERY 5 MIN PRN
Status: COMPLETED | OUTPATIENT
Start: 2023-12-04 | End: 2023-12-04

## 2023-12-04 RX ORDER — LIDOCAINE HCL/EPINEPHRINE/PF 2%-1:200K
VIAL (ML) INJECTION
Status: COMPLETED | OUTPATIENT
Start: 2023-12-04 | End: 2023-12-04

## 2023-12-04 RX ORDER — ONDANSETRON 2 MG/ML
4 INJECTION INTRAMUSCULAR; INTRAVENOUS EVERY 6 HOURS PRN
Status: DISCONTINUED | OUTPATIENT
Start: 2023-12-04 | End: 2023-12-04 | Stop reason: HOSPADM

## 2023-12-04 RX ORDER — ACETAMINOPHEN 325 MG/1
650 TABLET ORAL EVERY 4 HOURS PRN
Status: DISCONTINUED | OUTPATIENT
Start: 2023-12-04 | End: 2023-12-05 | Stop reason: HOSPADM

## 2023-12-04 RX ADMIN — DOCUSATE SODIUM 100 MG: 100 CAPSULE, LIQUID FILLED ORAL at 21:02

## 2023-12-04 RX ADMIN — EPHEDRINE SULFATE 10 MG: 5 INJECTION INTRAVENOUS at 04:10

## 2023-12-04 RX ADMIN — Medication 100 MCG: at 03:28

## 2023-12-04 RX ADMIN — Medication: at 02:45

## 2023-12-04 RX ADMIN — EPHEDRINE SULFATE 5 MG: 5 INJECTION INTRAVENOUS at 05:06

## 2023-12-04 RX ADMIN — Medication 1 MILLI-UNITS/MIN: at 07:00

## 2023-12-04 RX ADMIN — METOCLOPRAMIDE HYDROCHLORIDE 10 MG: 5 INJECTION INTRAMUSCULAR; INTRAVENOUS at 03:00

## 2023-12-04 RX ADMIN — IBUPROFEN 800 MG: 800 TABLET ORAL at 18:47

## 2023-12-04 RX ADMIN — Medication: at 08:19

## 2023-12-04 RX ADMIN — SODIUM CHLORIDE, POTASSIUM CHLORIDE, SODIUM LACTATE AND CALCIUM CHLORIDE: 600; 310; 30; 20 INJECTION, SOLUTION INTRAVENOUS at 10:00

## 2023-12-04 RX ADMIN — Medication 100 MCG: at 03:15

## 2023-12-04 RX ADMIN — Medication 100 MCG: at 03:56

## 2023-12-04 RX ADMIN — SODIUM CHLORIDE, POTASSIUM CHLORIDE, SODIUM LACTATE AND CALCIUM CHLORIDE 500 ML: 600; 310; 30; 20 INJECTION, SOLUTION INTRAVENOUS at 01:32

## 2023-12-04 RX ADMIN — Medication 100 MCG: at 03:20

## 2023-12-04 RX ADMIN — CALCIUM CARBONATE (ANTACID) CHEW TAB 500 MG 1000 MG: 500 CHEW TAB at 10:04

## 2023-12-04 RX ADMIN — SODIUM CHLORIDE, POTASSIUM CHLORIDE, SODIUM LACTATE AND CALCIUM CHLORIDE: 600; 310; 30; 20 INJECTION, SOLUTION INTRAVENOUS at 05:38

## 2023-12-04 RX ADMIN — Medication 100 MCG: at 05:19

## 2023-12-04 RX ADMIN — SODIUM CHLORIDE, POTASSIUM CHLORIDE, SODIUM LACTATE AND CALCIUM CHLORIDE 250 ML: 600; 310; 30; 20 INJECTION, SOLUTION INTRAVENOUS at 03:18

## 2023-12-04 RX ADMIN — LIDOCAINE HYDROCHLORIDE,EPINEPHRINE BITARTRATE 6 ML: 20; .005 INJECTION, SOLUTION EPIDURAL; INFILTRATION; INTRACAUDAL; PERINEURAL at 02:20

## 2023-12-04 RX ADMIN — Medication 340 ML/HR: at 15:47

## 2023-12-04 RX ADMIN — ONDANSETRON 4 MG: 2 INJECTION INTRAMUSCULAR; INTRAVENOUS at 01:37

## 2023-12-04 RX ADMIN — SODIUM CHLORIDE, POTASSIUM CHLORIDE, SODIUM LACTATE AND CALCIUM CHLORIDE: 600; 310; 30; 20 INJECTION, SOLUTION INTRAVENOUS at 02:19

## 2023-12-04 RX ADMIN — NALBUPHINE HYDROCHLORIDE 2.5 MG: 20 INJECTION, SOLUTION INTRAMUSCULAR; INTRAVENOUS; SUBCUTANEOUS at 12:23

## 2023-12-04 RX ADMIN — EPHEDRINE SULFATE 10 MG: 5 INJECTION INTRAVENOUS at 05:44

## 2023-12-04 RX ADMIN — LIDOCAINE HYDROCHLORIDE 2 ML: 10 INJECTION, SOLUTION EPIDURAL; INFILTRATION; INTRACAUDAL; PERINEURAL at 15:30

## 2023-12-04 ASSESSMENT — ACTIVITIES OF DAILY LIVING (ADL)
ADLS_ACUITY_SCORE: 20

## 2023-12-04 NOTE — H&P
OBSTETRICS ADMISSION HISTORY & PHYSICAL  DATE OF ADMISSION: 12/3/2023  7:04 PM      Assessment and Plan:   Assessment:  Grecia Post is a 33 year old  at 39w3d in active labor. Membranes are ruptured.   Patient Active Problem List   Diagnosis    Generalized anxiety disorder    Obsessional thoughts    Cervical high risk HPV (human papillomavirus) test positive    Major depressive disorder, single episode    Varicella    H/O  section    Encounter to establish care    Supervision of high-risk pregnancy of elderly multigravida    Labor and delivery, indication for care      PLAN:   Admit - see IP orders  Pain medication per orders  Anticipate   Start pitocin if no ctx in next hour  GBS: negative. Antibiotics are not indicated   Will monitor labor progress along with RN and update attending physician  7.   Will notify Sherrie Matson    Patient discussed with attending physician, Dr. Sherrie Floyd , who agrees with the plan.     Chad Jensen MD, MEd  Resident Physician (PGY-1)  [facility] / [facility] - [facility]  Pager: [phone] (7:30am - 5:30pm)  Text paging       Chief Complaint:     SROM       History of Present Illness:     Grecia Post is a 33 year old year old  at 39w3d confirmed by ultrasound. Patient received prenatal care with Sherrie Floyd at [facility].  Presents to the [facility] for SROM.  Denies contractions. She reports fluid leakage (gush of fluid at ~1830). She denies bleeding per vagina. Fetal movement is normal and pain free.    Her prenatal course has been complicated by prior  and by COVID one month prior to admission.    Prenatal labs   Lab Results   Component Value Date    AS Negative 2023    HEPBANG Nonreactive 2023    HGB 12.0 09/15/2023     GBS was collected on 23.  Weight gain during pregnancy: 17.8 kg (39 lb 4.8 oz)         Obstetrical History:     OB History    Para Term  AB  Living   2 1 1 0 1 2   SAB IAB Ectopic Multiple Live Births   0 0 1 2 2      # Outcome Date GA Lbr Justice/2nd Weight Sex Delivery Anes PTL Lv   2 Current            1A Ectopic 04/18/22 38w2d  2.48 kg (5 lb 7.5 oz) M CS-Unspec   KAYLEY      Birth Comments: scheduled C section for twins      Name: Berhane      Apgar1: 8  Apgar5: 9   1B Term 04/18/22 38w2d  2.722 kg (6 lb) M CS-Unspec   KAYLEY      Name: Praveen      Apgar1: 7  Apgar5: 9          Immunzations:     Most Recent Immunizations   Administered Date(s) Administered    COVID-19 MONOVALENT 12+ (Pfizer) 05/20/2021    DTAP (<7y) 09/01/1993    Dtap, 5 Pertussis Antigens (DAPTACEL) 1988    HIB (PRP-T) 03/23/1992    HPV Quadrivalent 12/17/2008    HPV9 12/12/2007    Hepatitis B, Adult 09/07/1999    Hepatitis B, Peds 09/07/1999    Hib, Unspecified 03/23/1992    Historical DTP/aP 09/01/2000    Influenza (H1N1) 11/05/2009    Influenza Vaccine >6 months,quad, PF 09/27/2023    Influenza Vaccine, 6+MO IM (QUADRIVALENT W/PRESERVATIVES) 10/08/2019    MMR 01/14/1999    Meningococcal (Menomune ) 12/12/2007    Meningococcal ACWY (Menactra ) 12/12/2007    Polio, Unspecified  09/01/1993    Poliovirus, inactivated (IPV) 09/01/1993    TDAP (Adacel,Boostrix) 09/29/2023    Td (Adult), Adsorbed 09/01/2000     Tdap this pregnancy?  YES - Date: 9/29  Flu shot this pregnancy?YES - Date: 9/27  COVID vaccine? YES - Date: 2021         Past Medical History:     Past Medical History:   Diagnosis Date    Anxiety     Depression     Seizure (H)           Past Surgical History:     Past Surgical History:   Procedure Laterality Date    BUNIONECTOMY Left     MAMMOPLASTY AUGMENTATION      WISDOM TOOTH EXTRACTION            Family History:     Family History   Problem Relation Age of Onset    Atrial fibrillation Father     Nephrolithiasis Father     Pancreatic Cancer Maternal Grandmother     Anxiety Disorder Mother     Depression Mother     Fibrocystic breast disease Mother     Other - See Comments  "Sister         Vertebral artery dissection    Other - See Comments Brother     Lupus Brother     No Known Problems Brother           Social History:   no tobacco use  no alcohol use  no illicit drug use         Medications:   No current facility-administered medications on file prior to encounter.  escitalopram (LEXAPRO) 10 MG tablet, Take 1.5 tablets (15 mg) by mouth daily  magnesium 250 MG tablet, Take 1 tablet by mouth daily  Prenatal Vit-Fe Fumarate-FA (PRENATAL PO),   vitamin D3 (CHOLECALCIFEROL) 50 mcg (2000 units) tablet, Take 1 tablet by mouth daily           Allergies:   Contrast dye, Methylpyrrolidone, and Other drug allergy (see comments)         Review of Systems:   CONSTITUTIONAL: no fatigue, no unexpected change in weight  EYES: no acute vision problems or changes  RESP: no significant cough, no shortness of breath  CV: no chest pain, no palpitations, no new or worsening peripheral edema  GI: no nausea, no vomiting, no constipation, no diarrhea  : no frequency, no dysuria, no hematuria  NEURO: no weakness, no dizziness, no headaches         Physical Exam:   Vitals:   BP (!) 152/81 (BP Location: Right arm, Patient Position: Semi-Edmondson's, Cuff Size: Adult Regular)   Pulse 76   Temp 98  F (36.7  C) (Oral)   Resp 16   LMP 03/02/2023 (Approximate)   SpO2 98%   0 lbs 0 oz  Estimated body mass index is 26.49 kg/m  as calculated from the following:    Height as of 11/29/23: 1.626 m (5' 4\").    Weight as of 11/29/23: 70 kg (154 lb 4.8 oz).    GEN: Awake, alert in no apparent distress   HEENT: grossly normal  RESPIRATORY: clear to auscultation bilaterally, no increased work of breathing  CARDIOVASCULAR: RRR, no murmur  ABDOMEN: gravid, nontender  EXT:  no edema or calf tenderness    Electronic Fetal Monitoring:  Baseline rate normal (140)  Variability moderate  Accelerations present  Decelerations not present    Assessment: Category I EFM interpretation suggests absence of concern for fetal metabolic " acidemia at this time due to accelerations present, decelerations absent, heart rate: normal baseline, and variability: moderate    Uterine Activity normal.    Strip reviewed on unit

## 2023-12-04 NOTE — ANESTHESIA PREPROCEDURE EVALUATION
Anesthesia Pre-Procedure Evaluation    Patient: Grecia Post   MRN: [id number] : 1990        Procedure :           Past Medical History:   Diagnosis Date    Anxiety     Depression     Seizure (H)       Past Surgical History:   Procedure Laterality Date    BUNIONECTOMY Left     MAMMOPLASTY AUGMENTATION      WISDOM TOOTH EXTRACTION        Allergies   Allergen Reactions    Contrast Dye      PN: LW CM1: >>> NO CONTRAST ADVERSE REACTION <<<     Reaction :    Methylpyrrolidone Unknown    Other Drug Allergy (See Comments) Unknown     Contrast media  Unsure what reaction was.       Social History     Tobacco Use    Smoking status: Former     Types: Cigarettes     Passive exposure: Past    Smokeless tobacco: Never    Tobacco comments:     No Vaping   Substance Use Topics    Alcohol use: Yes      Wt Readings from Last 1 Encounters:   23 70.3 kg (155 lb)        Anesthesia Evaluation            ROS/MED HX  ENT/Pulmonary:  - neg pulmonary ROS     Neurologic:  - neg neurologic ROS     Cardiovascular:  - neg cardiovascular ROS     METS/Exercise Tolerance:     Hematologic:  - neg hematologic  ROS     Musculoskeletal:       GI/Hepatic:  - neg GI/hepatic ROS     Renal/Genitourinary:       Endo:  - neg endo ROS     Psychiatric/Substance Use:  - neg psychiatric ROS   (+) psychiatric history anxiety and 1       Infectious Disease:       Malignancy:       Other:   Pregnant         Physical Exam    Airway        Mallampati: II   TM distance: > 3 FB   Neck ROM: full   Mouth opening: > 3 cm    Respiratory Devices and Support         Dental  no notable dental history         Cardiovascular   cardiovascular exam normal          Pulmonary   pulmonary exam normal                OUTSIDE LABS:  CBC:   Lab Results   Component Value Date    WBC 9.8 2023    WBC 8.4 2023    HGB 13.7 2023    HGB 12.0 09/15/2023    HCT 41.3 2023    HCT 40.9 2023     2023     2023     BMP:   Lab  "Results   Component Value Date     03/31/2020    POTASSIUM 3.6 03/31/2020    CHLORIDE 105 03/31/2020    CO2 23 03/31/2020    BUN 10 03/31/2020    CR 0.79 03/31/2020    GLC 95 03/31/2020     COAGS: No results found for: \"PTT\", \"INR\", \"FIBR\"  POC:   Lab Results   Component Value Date    HCG Negative 03/31/2020     HEPATIC:   Lab Results   Component Value Date    ALBUMIN 4.5 03/31/2020    PROTTOTAL 7.4 03/31/2020    ALT 16 03/31/2020    AST 18 03/31/2020    ALKPHOS 39 (L) 03/31/2020    BILITOTAL 1.3 (H) 03/31/2020     OTHER:   Lab Results   Component Value Date    AGATHA 9.3 03/31/2020    CRP <0.1 03/31/2020    SED 2 03/31/2020       Anesthesia Plan    ASA Status:  3       Anesthesia Type: Epidural.              Consents    Anesthesia Plan(s) and associated risks, benefits, and realistic alternatives discussed. Questions answered and patient/representative(s) expressed understanding.     - Discussed:     - Discussed with:  Patient            Postoperative Care            Comments:           neg OB ROS.      Dane Castellano MD    I have reviewed the pertinent notes and labs in the chart from the past 30 days and (re)examined the patient.  Any updates or changes from those notes are reflected in this note.                  "

## 2023-12-04 NOTE — PROGRESS NOTES
[facility] Labor and Delivery Progress Note    Grecia Post MRN# [id number]   Age: 33 year old YOB: 1990           Subjective:   Patient overall progressing well, significant low BP's since epidural placed around 2:30 treated multiple times through out the night , currently blood pressures in good range, few variable decels during low BP's reading but overall cat 1 tracing            Objective:   Patient Vitals for the past 24 hrs:   BP Temp Temp src Pulse Resp SpO2 Height Weight BMI (Calculated) Oximeter Heart Rate   12/04/23 1217 123/78 -- -- -- -- -- -- -- -- 78 bpm   12/04/23 1214 -- -- -- -- -- 98 % -- -- -- 77 bpm   12/04/23 1159 -- -- -- -- -- 96 % -- -- -- 71 bpm   12/04/23 1144 -- -- -- -- -- 97 % -- -- -- 84 bpm   12/04/23 1131 119/72 -- -- -- -- -- -- -- -- 80 bpm   12/04/23 1130 -- 97.7  F (36.5  C) Oral -- -- -- -- -- -- --   12/04/23 1129 -- -- -- -- -- 97 % -- -- -- 81 bpm   12/04/23 1114 -- -- -- -- -- 97 % -- -- -- 78 bpm   12/04/23 1101 121/75 -- -- -- -- -- -- -- -- 73 bpm   12/04/23 1059 -- -- -- -- -- 96 % -- -- -- 78 bpm   12/04/23 1044 -- -- -- -- -- 95 % -- -- -- 78 bpm   12/04/23 1031 127/75 -- -- -- -- -- -- -- -- 78 bpm   12/04/23 1030 -- 97.9  F (36.6  C) Oral -- -- -- -- -- -- --   12/04/23 1029 -- -- -- -- -- 98 % -- -- -- 78 bpm   12/04/23 1014 -- -- -- -- -- 98 % -- -- -- 95 bpm   12/04/23 1001 109/71 -- -- -- -- -- -- -- -- 80 bpm   12/04/23 0959 -- -- -- -- -- 98 % -- -- -- 88 bpm   12/04/23 0944 -- -- -- -- -- 98 % -- -- -- 88 bpm   12/04/23 0931 111/70 -- -- -- -- -- -- -- -- 85 bpm   12/04/23 0930 -- 98.6  F (37  C) Oral -- -- -- -- -- -- --   12/04/23 0929 113/68 -- -- -- -- 98 % -- -- -- 82 bpm   12/04/23 0924 121/77 -- -- -- -- -- -- -- -- 86 bpm   12/04/23 0919 120/71 -- -- -- -- -- -- -- -- 90 bpm   12/04/23 0915 121/79 -- -- -- -- -- -- -- -- 81 bpm   12/04/23 0914 -- -- -- -- -- 99 % -- -- -- 81 bpm   12/04/23 0909 119/79 -- -- -- -- -- -- -- --  80 bpm   12/04/23 0905 117/70 -- -- -- -- -- -- -- -- 79 bpm   12/04/23 0859 119/61 -- -- -- -- 99 % -- -- -- 86 bpm   12/04/23 0854 126/75 -- -- -- -- -- -- -- -- 88 bpm   12/04/23 0849 119/75 -- -- -- -- -- -- -- -- 81 bpm   12/04/23 0844 118/77 -- -- -- -- 99 % -- -- -- 86 bpm   12/04/23 0839 126/77 -- -- -- -- -- -- -- -- 88 bpm   12/04/23 0833 123/74 -- -- -- -- -- -- -- -- 89 bpm   12/04/23 0831 115/71 -- -- -- -- -- -- -- -- 83 bpm   12/04/23 0829 115/71 -- -- -- -- 99 % -- -- -- 86 bpm   12/04/23 0827 131/75 -- -- -- -- -- -- -- -- 81 bpm   12/04/23 0825 118/75 -- -- -- -- -- -- -- -- 83 bpm   12/04/23 0823 124/78 -- -- -- -- -- -- -- -- 78 bpm   12/04/23 0821 119/74 -- -- -- -- -- -- -- -- 94 bpm   12/04/23 0819 121/75 -- -- -- -- -- -- -- -- 85 bpm   12/04/23 0814 -- -- -- -- -- 99 % -- -- -- 83 bpm   12/04/23 0759 -- -- -- -- -- 99 % -- -- -- 90 bpm   12/04/23 0744 -- -- -- -- -- 100 % -- -- -- 89 bpm   12/04/23 0729 -- -- -- -- -- 98 % -- -- -- 89 bpm   12/04/23 0727 125/80 97.8  F (36.6  C) Oral 88 16 -- -- -- -- 88 bpm   12/04/23 0714 -- -- -- -- -- 98 % -- -- -- 84 bpm   12/04/23 0659 -- -- -- -- -- 99 % -- -- -- 84 bpm   12/04/23 0649 116/70 -- -- -- -- -- -- -- -- 93 bpm   12/04/23 0644 114/69 -- -- -- -- 98 % -- -- -- 87 bpm   12/04/23 0639 117/70 -- -- -- -- -- -- -- -- 86 bpm   12/04/23 0634 123/67 -- -- -- -- -- -- -- -- 87 bpm   12/04/23 0629 121/70 -- -- -- -- 99 % -- -- -- 95 bpm   12/04/23 0624 112/70 -- -- -- -- -- -- -- -- 89 bpm   12/04/23 0619 112/69 -- -- -- -- -- -- -- -- 83 bpm   12/04/23 0614 119/70 -- -- -- -- 99 % -- -- -- 85 bpm   12/04/23 0609 117/75 -- -- -- -- -- -- -- -- 91 bpm   12/04/23 0604 126/73 -- -- -- -- -- -- -- -- 92 bpm   12/04/23 0559 -- -- -- -- -- 98 % -- -- -- 105 bpm   12/04/23 0558 114/57 -- -- -- -- -- -- -- -- 93 bpm   12/04/23 0556 112/59 -- -- -- -- -- -- -- -- 82 bpm   12/04/23 0554 115/60 -- -- -- -- -- -- -- -- 85 bpm   12/04/23 0552 122/59 -- -- -- --  -- -- -- -- 80 bpm   12/04/23 0549 109/61 -- -- -- -- -- -- -- -- 82 bpm   12/04/23 0547 103/62 -- -- -- -- -- -- -- -- 111 bpm   12/04/23 0544 -- -- -- -- -- 98 % -- -- -- 81 bpm   12/04/23 0543 91/52 -- -- -- -- -- -- -- -- 80 bpm   12/04/23 0538 99/55 -- -- -- -- -- -- -- -- 79 bpm   12/04/23 0533 101/57 -- -- -- -- -- -- -- -- 77 bpm   12/04/23 0529 -- -- -- -- -- 97 % -- -- -- 84 bpm   12/04/23 0528 103/55 -- -- -- -- -- -- -- -- 78 bpm   12/04/23 0526 108/58 -- -- -- -- -- -- -- -- 73 bpm   12/04/23 0524 113/63 -- -- -- -- -- -- -- -- 74 bpm   12/04/23 0522 112/63 -- -- -- -- -- -- -- -- 92 bpm   12/04/23 0520 109/65 -- -- -- -- -- -- -- -- 85 bpm   12/04/23 0518 (!) 62/36 -- -- -- -- -- -- -- -- 112 bpm   12/04/23 0516 (!) 79/46 -- -- -- -- -- -- -- -- 108 bpm   12/04/23 0514 91/51 -- -- -- -- 98 % -- -- -- 101 bpm   12/04/23 0512 101/58 -- -- -- -- -- -- -- -- 86 bpm   12/04/23 0510 103/58 -- -- -- -- -- -- -- -- 83 bpm   12/04/23 0505 (!) 86/51 -- -- -- -- -- -- -- -- 85 bpm   12/04/23 0500 (!) 83/49 -- -- -- -- -- -- -- -- 86 bpm   12/04/23 0459 -- -- -- -- -- 97 % -- -- -- 86 bpm   12/04/23 0455 98/57 -- -- -- -- -- -- -- -- 84 bpm   12/04/23 0450 96/52 -- -- -- -- -- -- -- -- 82 bpm   12/04/23 0445 92/52 97.8  F (36.6  C) Oral -- -- -- -- -- -- 95 bpm   12/04/23 0444 -- -- -- -- -- 99 % -- -- -- 100 bpm   12/04/23 0440 100/60 -- -- -- -- -- -- -- -- 100 bpm   12/04/23 0435 98/60 -- -- -- -- -- -- -- -- 93 bpm   12/04/23 0430 108/66 -- -- -- -- -- -- -- -- 81 bpm   12/04/23 0429 -- -- -- -- -- 96 % -- -- -- 82 bpm   12/04/23 0425 98/60 -- -- -- -- -- -- -- -- 88 bpm   12/04/23 0420 98/60 -- -- -- -- -- -- -- -- 85 bpm   12/04/23 0417 96/57 -- -- -- -- -- -- -- -- 84 bpm   12/04/23 0415 98/59 -- -- -- -- -- -- -- -- 89 bpm   12/04/23 0414 99/60 -- -- -- -- 97 % -- -- -- 83 bpm   12/04/23 0412 112/68 -- -- -- -- -- -- -- -- 69 bpm   12/04/23 0410 (!) 78/46 -- -- -- -- -- -- -- -- 81 bpm   12/04/23 0408 (!)  84/53 -- -- -- -- -- -- -- -- 79 bpm   12/04/23 0406 106/61 -- -- -- -- -- -- -- -- 75 bpm   12/04/23 0404 108/55 -- -- -- -- -- -- -- -- 86 bpm   12/04/23 0400 109/59 -- -- -- -- -- -- -- -- 80 bpm   12/04/23 0359 -- -- -- -- -- 99 % -- -- -- 86 bpm   12/04/23 0356 (!) 60/35 -- -- -- -- -- -- -- -- 90 bpm   12/04/23 0355 (!) 62/37 -- -- -- -- -- -- -- -- 85 bpm   12/04/23 0350 (!) 84/47 -- -- -- -- -- -- -- -- 82 bpm   12/04/23 0345 (!) 84/47 -- -- -- -- -- -- -- -- 84 bpm   12/04/23 0344 -- -- -- -- -- 98 % -- -- -- 86 bpm   12/04/23 0340 91/50 -- -- -- -- -- -- -- -- 80 bpm   12/04/23 0338 97/52 -- -- -- -- -- -- -- -- 75 bpm   12/04/23 0335 107/56 -- -- -- -- -- -- -- -- 73 bpm   12/04/23 0334 98/55 -- -- -- -- -- -- -- -- 76 bpm   12/04/23 0331 106/60 -- -- -- -- -- -- -- -- 71 bpm   12/04/23 0330 109/61 -- -- -- -- -- -- -- -- 73 bpm   12/04/23 0329 -- -- -- -- -- 99 % -- -- -- 98 bpm   12/04/23 0328 (!) 76/43 -- -- -- -- -- -- -- -- 84 bpm   12/04/23 0325 (!) 85/49 -- -- -- -- -- -- -- -- 83 bpm   12/04/23 0324 (!) 87/53 -- -- -- -- -- -- -- -- 81 bpm   12/04/23 0322 103/58 -- -- -- -- -- -- -- -- 68 bpm   12/04/23 0320 (!) 86/51 -- -- -- -- -- -- -- -- 85 bpm   12/04/23 0318 (!) 75/40 -- -- -- -- -- -- -- -- 88 bpm   12/04/23 0314 (!) 87/44 -- -- -- -- 96 % -- -- -- 83 bpm   12/04/23 0308 106/58 -- -- -- -- -- -- -- -- 86 bpm   12/04/23 0305 123/69 -- -- -- -- -- -- -- -- 88 bpm   12/04/23 0259 -- -- -- -- -- 98 % -- -- -- 105 bpm   12/04/23 0258 -- -- -- -- -- 90 % -- -- -- 83 bpm   12/04/23 0257 103/59 -- -- -- -- -- -- -- -- 105 bpm   12/04/23 0252 107/63 -- -- -- -- -- -- -- -- 100 bpm   12/04/23 0248 118/66 -- -- -- -- -- -- -- -- 96 bpm   12/04/23 0245 114/69 -- -- -- -- -- -- -- -- 96 bpm   12/04/23 0244 -- -- -- -- -- 100 % -- -- -- 93 bpm   12/04/23 0243 125/74 -- -- -- -- -- -- -- -- 91 bpm   12/04/23 0241 118/77 -- -- -- -- -- -- -- -- 87 bpm   12/04/23 0239 123/76 -- -- -- -- -- -- -- -- 90 bpm  "  23 120/76 -- -- -- -- -- -- -- -- 102 bpm   23 0235 124/78 -- -- -- -- -- -- -- -- 78 bpm   233 127/76 -- -- -- -- -- -- -- -- 71 bpm   231 (!) (P) 143/88 -- -- -- -- -- -- -- -- 85 bpm   23 0228 -- -- -- -- -- 100 % -- -- -- 95 bpm   23 0142 124/82 97.8  F (36.6  C) Oral 73 18 -- -- -- -- 73 bpm   23 -- 98  F (36.7  C) Oral -- -- -- -- -- -- --   23 -- 98.5  F (36.9  C) Oral -- -- -- -- -- -- --   23 128/84 98.5  F (36.9  C) Oral 77 16 -- -- -- -- 77 bpm   23 (!) 140/86 -- -- -- -- -- -- -- -- 83 bpm   23 135/84 -- -- -- -- -- -- -- -- 90 bpm   23 (!) 152/81 98  F (36.7  C) Oral 76 16 98 % 1.6 m (5' 3\") 70.3 kg (155 lb) 27.46 76 bpm         Cervical Exam: 10 / 100% / 0      Position: Anterior    Membranes: Ruptured      Fetal Heart Rate:    Monitor: external US    Variability: moderate (amplitude range 6 to 25 bpm)    Baseline Rate: normal range    Fetal Heart Rate Tracing: cat 1          Assessment:   Grecia Post is a 33 year old  who is 39w4d admitted for SROM and early labor progressed very well and got complete with pitocin augmentation           Plan:   Anticipate     MD Sherrie Oconnor MD 2023 1:43 PM   [facility].  [phone]    "

## 2023-12-04 NOTE — ANESTHESIA PROCEDURE NOTES
"Epidural catheter Procedure Note    Pre-Procedure   Staff -        Anesthesiologist:  Dane Castellano MD       Performed By: anesthesiologist       Location: OB       Procedure Start/Stop Times: 12/4/2023 2:20 AM and 12/4/2023 2:46 AM       Pre-Anesthestic Checklist: patient identified, IV checked, risks and benefits discussed, informed consent, monitors and equipment checked, pre-op evaluation, at physician/surgeon's request and post-op pain management  Timeout:       Correct Patient: Yes        Correct Procedure: Yes        Correct Site: Yes        Correct Position: Yes   Procedure Documentation  Procedure: epidural catheter       Patient Position: sitting       Skin prep: Chloraprep       Local skin infiltrated with 1.5 mL of 1% lidocaine.        Insertion Site: L2-3. (midline approach).       Technique: LORT saline        CT at 4 cm.       Needle Type: [employer] needle       Needle Gauge: 18.        Needle Length (Inches): 3.5        Catheter: 20 G.          Catheter threaded easily.             # of attempts: 1 and  # of redirects:  0    Assessment/Narrative         Paresthesias: No.       Test dose of 3 mL lidocaine 1.5% w/ 1:200,000 epinephrine at.         Test dose negative, 3 minutes after injection, for signs of intravascular, subdural, or intrathecal injection.       Insertion/Infusion Method: LORT saline       Aspiration negative for Heme or CSF via Epidural Catheter.    Medication(s) Administered   0.125% Bupivacaine + 2 mcg/mL Fentanyl via CADD (Epidural) - EPIDURAL   1 mL - 12/4/2023 2:20:00 AM  2% Lidocaine w/ 1:200K Epi (EPIDURAL) - EPIDURAL   6 mL - 12/4/2023 2:20:00 AM  Medication Administration Time: 12/4/2023 2:20 AM     Comments:  Easy epidural placement.  Epidural pump difficulties.  Easy manual bolus with great analgesia.      FOR [facility] ([facility] [place]) ONLY:   Pain Team Contact information: please page the Pain Team Via [employer]. Search \"Pain\". During daytime hours, please page the attending " first. At night please page the resident first.

## 2023-12-04 NOTE — PROGRESS NOTES
Grecia Post, , 39w3d, arrived to triage, accompanied by Mervin meyer. Patient here with c/o SROM about 1830, large gush, continues to leak. Patient under care of Dr. Floyd, did not consult provider prior to arrival, did call unit.     Patient reports mild contractions. Patient denies pain. Patient reports SROM. Patient denies vaginal bleeding or change in discharge. Patient denies s/s of preeclampsia. Fetal movement Present. Patient denies issues this pregnancy.    Patient oriented to room, call light in reach. EFM and toco applied. SVE 1.5/50/-3, soft, posterior.  Provider updated, plan to admit, start pitocin if no ctx in an hour. Okay to involve OB resident at patient discretion.    Chacorta Eckert RN

## 2023-12-04 NOTE — PLAN OF CARE
Patient progressing in labor, TOLAC. SVE on admission 1.5/50/-3, progressed to 4/90/0 by this AM. Epidural placed at 0230, unstable BP since. Given multiple doses of phenylephrine and ephedrine. IV infusing LR at 125 ml/hr, given multiple boluses due to low BP. Patient asymptomatic with BP changes. Epidural off as of end of shift, plan to rebolus when patient reporting pain per anesthesia. Continuous EFM in place.  Fiance and sister at bedside, supportive. Labor education on-going.    Chacorta Eckert RN      Problem: Adult Inpatient Plan of Care  Goal: Plan of Care Review  Outcome: Progressing  Flowsheets (Taken 12/4/2023 0000)  Plan of Care Reviewed With: patient  Overall Patient Progress: improving  Goal: Patient-Specific Goal (Individualized)  Outcome: Progressing  Flowsheets (Taken 12/4/2023 0000)  Patient/Family-Specific Goals (Include Timeframe): progress in labor, have a baby!  Goal: Absence of Hospital-Acquired Illness or Injury  Outcome: Progressing  Intervention: Prevent Skin Injury  Recent Flowsheet Documentation  Taken 12/4/2023 0142 by Chacorta Eckert RN  Body Position: position changed independently  Taken 12/3/2023 2132 by Chacorta Eckert RN  Body Position: position changed independently  Goal: Optimal Comfort and Wellbeing  Outcome: Progressing  Intervention: Provide Person-Centered Care  Recent Flowsheet Documentation  Taken 12/4/2023 0142 by Chacorta Eckert RN  Trust Relationship/Rapport:   care explained   choices provided   emotional support provided   empathic listening provided   questions answered   questions encouraged   reassurance provided   thoughts/feelings acknowledged  Taken 12/3/2023 2132 by Chacorta Eckert RN  Trust Relationship/Rapport:   care explained   choices provided   emotional support provided   empathic listening provided   questions answered   questions encouraged   reassurance provided   thoughts/feelings acknowledged  Goal: Readiness for  Transition of Care  Outcome: Progressing  Intervention: Mutually Develop Transition Plan  Recent Flowsheet Documentation  Taken 12/3/2023 2009 by Chacorta Eckert RN  Equipment Currently Used at Home: none     Problem: Labor  Goal: Hemostasis  Outcome: Progressing  Goal: Stable Fetal Wellbeing  Outcome: Progressing  Intervention: Promote and Monitor Fetal Wellbeing  Recent Flowsheet Documentation  Taken 12/4/2023 0142 by Chacorta Eckert RN  Body Position: position changed independently  Taken 12/3/2023 2132 by Chacorta Eckert RN  Body Position: position changed independently  Goal: Effective Progression to Delivery  Outcome: Progressing  Goal: Absence of Infection Signs and Symptoms  Outcome: Progressing  Goal: Acceptable Pain Control  Outcome: Progressing  Goal: Normal Uterine Contraction Pattern  Outcome: Progressing       Goal Outcome Evaluation:      Plan of Care Reviewed With: patient    Overall Patient Progress: improvingOverall Patient Progress: improving

## 2023-12-04 NOTE — PLAN OF CARE
of viable infant @1513 on 2023 at 39w4d. APGARS of 8 and 9 were assigned to infant. Infants birth weight was 3000 grams. Skin to skin with infant initiated after delivery. Patients delivery QBL was 250 mL per provider. Patient had a 2nd degree laceration with repair. Patient and significant other bonding well with infant.    Mishel Ramirez RN

## 2023-12-04 NOTE — L&D DELIVERY NOTE
OB Vaginal Delivery Note    Grecia Post MRN# [id number]   Age: 33 year old YOB: 1990       GA: 39w4d  GP:   Labor Complications: None   EBL:   mL  Delivery QBL:    Delivery Type: Vaginal, Spontaneous   ROM to Delivery Time: (Delivered) Hours: 20 Minutes: 43   Weight:     1 Minute 5 Minute 10 Minute   Apgar Totals:                 Delivery Details:  Grecia Post, a 33 year old  female delivered a viable infant with apgars of   and  . Patient was fully dilated and pushing after   hours   minutes in active labor. Delivery was via vaginal, spontaneous  to a sterile field under epidural  anesthesia. Infant delivered in vertex  left  occiput  anterior  position. Anterior and posterior shoulders delivered without difficulty. The cord was clamped, cut twice and   were noted. Cord blood was obtained in routine fashion with the following disposition:  .      Cord complications: none   Placenta delivered at  . Placental disposition was Hospital disposal . Fundal massage performed and fundus found to be firm.     Episiotomy: none    Perineum, vagina, cervix were inspected, and the following lacerations were noted:   Perineal lacerations: 2nd   periurethral laceration: bilateral             Any lacerations were repaired in the usual fashion using yes.    Excellent hemostasis was noted. Needle count correct. Infant and patient in delivery room in good and stable condition.        Sincere Female-Grecia [[id number]]      Labor Event Times      Latent labor onset date/time: 2023 0000           Labor Events     labor?: No   steroids: None  Labor Type: TOLAC (Trial of Labor After )  Predominate monitoring during 1st stage: continuous electronic fetal monitoring     Antibiotics received during labor?: No       Rupture date/time: 12/3/23 1830   Rupture type: Spontaneous Rupture of Membranes  Fluid color: Clear  Fluid odor: Normal     Augmentation:  Oxytocin       Delivery/Placenta Date and Time      Delivery Date: 12/4/23 Delivery Time:  3:13 PM                 Cord          Cord Complications: None                                       Stem cell collection?: No           Labor Events and Shoulder Dystocia    Fetal Tracing Prior to Delivery: Category 1  Shoulder dystocia present?: Neg                 Delivery (Maternal) (Provider to Complete) ([id number])    Episiotomy: None      Perineal lacerations: 2nd      Periurethral laceration: bilateral    Repair suture: 3-0 Chromic  Number of repair packets: 1  Genital tract inspection done: Pos       Blood Loss  Mother: Grecia Post R #[id number]     Start of Mother's Information      Delivery Blood Loss  12/04/23 0313 - 12/04/23 1548      None                 End of Mother's Information  Mother: Grecia Post R #[id number]                Delivery - Provider to Complete ([id number])    Delivery Type (Choose the 1 that will go to the Birth History): Vaginal, Spontaneous                                           Placenta    Removal: Spontaneous  Disposition: Hospital disposal             Anesthesia    Method: Epidural                    Presentation and Position    Presentation: Vertex    Position: Left Occiput Anterior                     Sherrie Floyd MD 12/4/2023 3:48 PM   [facility].  [phone]

## 2023-12-04 NOTE — PHARMACY-ADMISSION MEDICATION HISTORY
Pharmacist Admission Medication History    Admission medication history is complete. The information provided in this note is only as accurate as the sources available at the time of the update.    Medication reconciliation/reorder completed by provider prior to medication history? No    Information Source(s): Patient and CareEverywhere/SureScripts via in-person    Pertinent Information:     Changes made to PTA medication list:  Added: none  Deleted: magnesium  Changed: escitalopram is now 10mg qday    Medication Affordability:  Not including over the counter (OTC) medications, was there a time in the past 3 months when you did not take your medications as prescribed because of cost?: No    Allergies reviewed with patient and updates made in EHR: yes    Medications available for use during hospital stay: NONE.     Medication History Completed By: Farhat Holguin RPH 12/4/2023 12:26 PM    PTA Med List   Medication Sig Last Dose    escitalopram (LEXAPRO) 10 MG tablet Take 10 mg by mouth daily 12/3/2023 at am    Prenatal Vit-Fe Fumarate-FA (PRENATAL PO) Take 1 tablet by mouth daily 12/3/2023 at am    vitamin D3 (CHOLECALCIFEROL) 50 mcg (2000 units) tablet Take 50 mcg by mouth daily 12/3/2023 at am

## 2023-12-04 NOTE — ANESTHESIA POSTPROCEDURE EVALUATION
Patient: Grecia Post    Procedure: * No procedures listed *       Anesthesia Type:  Epidural    Note:  Disposition: Inpatient   Postop Pain Control: Uneventful            Sign Out: Well controlled pain   PONV: No   Neuro/Psych: Uneventful            Sign Out: Acceptable/Baseline neuro status   Airway/Respiratory: Uneventful            Sign Out: Acceptable/Baseline resp. status   CV/Hemodynamics: Uneventful            Sign Out: Acceptable CV status; No obvious hypovolemia; No obvious fluid overload   Other NRE: NONE   DID A NON-ROUTINE EVENT OCCUR? No           Last vitals:  Vitals:    12/04/23 1629 12/04/23 1630 12/04/23 1644   BP:  119/72    Pulse:      Resp:      Temp:      SpO2: 98%  99%       Electronically Signed By: Demetri Tanner MD  December 4, 2023  5:23 PM

## 2023-12-05 VITALS
TEMPERATURE: 98.5 F | HEART RATE: 90 BPM | HEIGHT: 63 IN | OXYGEN SATURATION: 99 % | RESPIRATION RATE: 16 BRPM | SYSTOLIC BLOOD PRESSURE: 135 MMHG | DIASTOLIC BLOOD PRESSURE: 81 MMHG | WEIGHT: 146 LBS | BODY MASS INDEX: 25.87 KG/M2

## 2023-12-05 LAB — HGB BLD-MCNC: 11.9 G/DL (ref 11.7–15.7)

## 2023-12-05 PROCEDURE — 85018 HEMOGLOBIN: CPT | Performed by: FAMILY MEDICINE

## 2023-12-05 PROCEDURE — 250N000013 HC RX MED GY IP 250 OP 250 PS 637: Performed by: FAMILY MEDICINE

## 2023-12-05 PROCEDURE — 36415 COLL VENOUS BLD VENIPUNCTURE: CPT | Performed by: FAMILY MEDICINE

## 2023-12-05 RX ORDER — HYDROCORTISONE 25 MG/G
CREAM TOPICAL 3 TIMES DAILY PRN
Qty: 28 G | Refills: 0 | Status: SHIPPED | OUTPATIENT
Start: 2023-12-05 | End: 2024-01-15

## 2023-12-05 RX ADMIN — IBUPROFEN 800 MG: 800 TABLET ORAL at 12:23

## 2023-12-05 RX ADMIN — IBUPROFEN 800 MG: 800 TABLET ORAL at 01:13

## 2023-12-05 RX ADMIN — IBUPROFEN 800 MG: 800 TABLET ORAL at 06:31

## 2023-12-05 RX ADMIN — DOCUSATE SODIUM 100 MG: 100 CAPSULE, LIQUID FILLED ORAL at 10:01

## 2023-12-05 ASSESSMENT — ACTIVITIES OF DAILY LIVING (ADL)
ADLS_ACUITY_SCORE: 20

## 2023-12-05 NOTE — PLAN OF CARE
Goal Outcome Evaluation:       Pt independent with cares, pain less 2, voiding  excited to go home with  and infant is feeding well

## 2023-12-05 NOTE — DISCHARGE SUMMARY
[facility] Discharge Summary    Grecia Post MRN# [id number]   Age: 33 year old YOB: 1990     Date of Admission:  12/3/2023  Date of Discharge::  2023  Admitting Physician:  Sherrie Floyd MD  Discharge Physician:  Sherrie Floyd MD     Home clinic: Sherrie Floyd MD 2023 1:05 PM   [facility].  [phone]         Admission Diagnoses:   Labor and delivery, indication for care [O75.9]          Discharge Diagnosis:     Intrauterine pregnancy at 39 weeks 4d gestation  Vaginal delivery after           Procedures:     Procedure(s): No additional procedures performed       No procedures performed during this admission           Medications Prior to Admission:     Medications Prior to Admission   Medication Sig Dispense Refill Last Dose    escitalopram (LEXAPRO) 10 MG tablet Take 10 mg by mouth daily   12/3/2023 at am    Prenatal Vit-Fe Fumarate-FA (PRENATAL PO) Take 1 tablet by mouth daily   12/3/2023 at am    vitamin D3 (CHOLECALCIFEROL) 50 mcg (2000 units) tablet Take 50 mcg by mouth daily   12/3/2023 at am             Discharge Medications:     Current Discharge Medication List        START taking these medications    Details   hydrocortisone, Perianal, (ANUSOL-HC) 2.5 % cream Place rectally 3 times daily as needed for hemorrhoids  Qty: 28 g, Refills: 0    Associated Diagnoses: External hemorrhoids           CONTINUE these medications which have NOT CHANGED    Details   escitalopram (LEXAPRO) 10 MG tablet Take 10 mg by mouth daily      Prenatal Vit-Fe Fumarate-FA (PRENATAL PO) Take 1 tablet by mouth daily      vitamin D3 (CHOLECALCIFEROL) 50 mcg (2000 units) tablet Take 50 mcg by mouth daily                   Consultations:   No consultations were requested during this admission            Hospital Course:   The patient's hospital course was unremarkable.  On discharge, her pain was well controlled. Vaginal bleeding is similar to peak menstrual  flow.  Voiding without difficulty.  Ambulating well and tolerating a normal diet.  No fever.  Breastfeeding well.  Infant is stable.  No bowel movement yet.*  She was discharged on post-partum day #1.    Post-partum hemoglobin:   Hemoglobin   Date Value Ref Range Status   12/05/2023 11.9 11.7 - 15.7 g/dL Final             Discharge Instructions and Follow-Up:     Discharge diet: Regular   Discharge activity: Lifting restricted to 15 pounds   Discharge follow-up: Follow up with primary care provider in 6 weeks   Wound care: Ice to area for comfort           Discharge Disposition:     Discharged to home      Attestation:  I have reviewed today's vital signs, notes, medications, labs and imaging.  Face-to-face time: 15 minutes  Total time: 25 minutes    Sherrie Floyd MD 12/5/2023 1:04 PM   [facility].  [phone]

## 2023-12-05 NOTE — DISCHARGE INSTRUCTIONS
Warning Signs after Having a Baby    Keep this paper on your fridge or somewhere else where you can see it.    Call your provider if you have any of these symptoms up to 12 weeks after having your baby.    Thoughts of hurting yourself or your baby  Pain in your chest or trouble breathing  Severe headache not helped by pain medicine  Eyesight concerns (blurry vision, seeing spots or flashes of light, other changes to eyesight)  Fainting, shaking or other signs of a seizure    Call 9-1-1 if you feel that it is an emergency.     The symptoms below can happen to anyone after giving birth. They can be very serious. Call your provider if you have any of these warning signs.    My provider s phone number: _______________________    Losing too much blood (hemorrhage)    Call your provider if you soak through a pad in less than an hour or pass blood clots bigger than a golf ball. These may be signs that you are bleeding too much.    Blood clots in the legs or lungs    After you give birth, your body naturally clots its blood to help prevent blood loss. Sometimes this increased clotting can happen in other areas of the body, like the legs or lungs. This can block your blood flow and be very dangerous.     Call your provider if you:  Have a red, swollen spot on the back of your leg that is warm or painful when you touch it.   Are coughing up blood.     Infection    Call your provider if you have any of these symptoms:  Fever of 100.4 F (38 C) or higher.  Pain or redness around your stitches if you had an incision.   Any yellow, white, or green fluid coming from places where you had stitches or surgery.    Mood Problems (postpartum depression)    Many people feel sad or have mood changes after having a baby. But for some people, these mood swings are worse.     Call your provider right away if you feel so anxious or nervous that you can't care for yourself or your baby.    Preeclampsia (high blood pressure)    Even if you  didn't have high blood pressure when you were pregnant, you are at risk for the high blood pressure disease called preeclampsia. This risk can last up to 12 weeks after giving birth.     Call your provider if you have:   Pain on your right side under your rib cage  Sudden swelling in the hands and face    Remember: You know your body. If something doesn't feel right, get medical help.     For informational purposes only. Not to replace the advice of your health care provider. Copyright 2020 [facility]. All rights reserved. Clinically reviewed by Mary Alice Sy, RNC-OB, MSN. [employer] 480580 - Rev 02/23.

## 2023-12-05 NOTE — DISCHARGE SUMMARY
Pt eager to go home ... Went thru discharge papers appropriate questions asked and answered information given and pt feeling prepared to go home.

## 2023-12-06 NOTE — TELEPHONE ENCOUNTER
OB Follow Up Phone Call        :   N/A    Language:   English    Discharge Follow-Up:  Follow-Up call by Outreach nurse: Message left for infant's mother.    Type of Delivery:      Feeding Method:  Breastfeeding    Comments:   Left message with Maternity Care Outreach phone number for infant's mother to call back if desired. Reminded mother to schedule/bring baby in to clinic for  check as directed by physician at discharge. Encouraged mother to call physician with any questions or concerns.

## 2023-12-13 ENCOUNTER — E-VISIT (OUTPATIENT)
Dept: FAMILY MEDICINE | Facility: CLINIC | Age: 33
End: 2023-12-13
Payer: COMMERCIAL

## 2023-12-13 DIAGNOSIS — N89.8 VAGINAL DISCHARGE: Primary | ICD-10-CM

## 2023-12-13 PROCEDURE — 99207 PR NON-BILLABLE SERV PER CHARTING: CPT | Performed by: FAMILY MEDICINE

## 2023-12-13 NOTE — PATIENT INSTRUCTIONS
Gordo Nguyen,    Most likely normal lochia but you have consistent discharge and itching then it could be yeast infection.  I will definitely recommend follow-up in the clinic if symptoms do not improve we can take a quick swab and then treat accordingly    Sherrie Floyd MD

## 2023-12-28 ENCOUNTER — MYC MEDICAL ADVICE (OUTPATIENT)
Dept: FAMILY MEDICINE | Facility: CLINIC | Age: 33
End: 2023-12-28
Payer: COMMERCIAL

## 2024-01-15 ENCOUNTER — OFFICE VISIT (OUTPATIENT)
Dept: FAMILY MEDICINE | Facility: CLINIC | Age: 34
End: 2024-01-15
Payer: COMMERCIAL

## 2024-01-15 VITALS
OXYGEN SATURATION: 98 % | BODY MASS INDEX: 23.73 KG/M2 | TEMPERATURE: 97.4 F | SYSTOLIC BLOOD PRESSURE: 100 MMHG | WEIGHT: 133.9 LBS | HEART RATE: 77 BPM | HEIGHT: 63 IN | DIASTOLIC BLOOD PRESSURE: 68 MMHG

## 2024-01-15 DIAGNOSIS — F32.0 CURRENT MILD EPISODE OF MAJOR DEPRESSIVE DISORDER WITHOUT PRIOR EPISODE (H): ICD-10-CM

## 2024-01-15 DIAGNOSIS — F42.8 OBSESSIONAL THOUGHTS: ICD-10-CM

## 2024-01-15 DIAGNOSIS — F41.1 GENERALIZED ANXIETY DISORDER: ICD-10-CM

## 2024-01-15 LAB
HGB BLD-MCNC: 13.9 G/DL (ref 11.7–15.7)
TSH SERPL DL<=0.005 MIU/L-ACNC: 0.87 UIU/ML (ref 0.3–4.2)

## 2024-01-15 PROCEDURE — 99207 PR POST PARTUM EXAM: CPT | Performed by: FAMILY MEDICINE

## 2024-01-15 PROCEDURE — 36415 COLL VENOUS BLD VENIPUNCTURE: CPT | Performed by: FAMILY MEDICINE

## 2024-01-15 PROCEDURE — 84443 ASSAY THYROID STIM HORMONE: CPT | Performed by: FAMILY MEDICINE

## 2024-01-15 RX ORDER — ESCITALOPRAM OXALATE 10 MG/1
15 TABLET ORAL DAILY
Qty: 135 TABLET | Refills: 0 | Status: SHIPPED | OUTPATIENT
Start: 2024-01-15 | End: 2024-02-26

## 2024-01-15 NOTE — PROGRESS NOTES
Grecia is here for a 6-week postpartum checkup.    She had a  of a viable girl,  with none complications.  Since delivery, she has been breast feeding.  She has No signs of infection, bleeding or other complications.  She is not pregnant.  We discussed contraceptions and she has chosen vasectomy her  has bettye in 3 wk meanwhile like to use condoms     EXAM:    HEENT: grossly normal.  NECK: no lymphadenopathy or thyroidomegaly.  LUNGS: CTA X 2, no rales or crackles.  BACK: No spinal or CVA tenderness.  HEART: RRR without murmurs clicks or gallops.  ABDOMEN: soft, non tender, good bowel sounds, without masses rebound, guarding or tenderness.    PELVIC:    External genitalia: normal without lesion, repair well healed.                            Vagina: normal mucosa and rugae, no discharge.  Cervix: multiparous, well healed, without lesion.  Uterus: non pregnant in size, firm , mobile, no lesions.  Adnexa: non tender, without masses  EXTREMITIES: Warm to touch, good pulses, no ankle edema or calf tenderness.  NEUROLOGIC: grossly normal.    ASSESSMENT:   Normal 6-week postpartum exam after .  Grecia was seen today for post partum exam.    Diagnoses and all orders for this visit:    Routine postpartum follow-up  -     OB HEMOGLOBIN; Future  -     TSH with free T4 reflex; Future  -     OB HEMOGLOBIN  -     TSH with free T4 reflex    Current mild episode of major depressive disorder without prior episode (H24)  Comments:  stable but worsening of intrusive thoughts of constantly worry about her children's being hurt/harm    Generalized anxiety disorder    Obsessional thoughts  Comments:  will increase the dose of lexapro to 15 mg first-titrate up to 20 if needed , offered psych therapy but like to wait    Other orders  -     REVIEW OF HEALTH MAINTENANCE PROTOCOL ORDERS  -     REVIEW OF HEALTH MAINTENANCE PROTOCOL ORDERS       PLAN:  Pap smear done and condoms for contraception.    Sherrie Floyd MD 1/15/2024  7:05 AM   [facility].  [phone]

## 2024-02-23 ENCOUNTER — MYC MEDICAL ADVICE (OUTPATIENT)
Dept: FAMILY MEDICINE | Facility: CLINIC | Age: 34
End: 2024-02-23
Payer: COMMERCIAL

## 2024-02-23 DIAGNOSIS — F42.8 OBSESSIONAL THOUGHTS: ICD-10-CM

## 2024-02-23 DIAGNOSIS — F32.0 CURRENT MILD EPISODE OF MAJOR DEPRESSIVE DISORDER WITHOUT PRIOR EPISODE (H): ICD-10-CM

## 2024-02-23 DIAGNOSIS — F41.1 GENERALIZED ANXIETY DISORDER: ICD-10-CM

## 2024-02-26 RX ORDER — ESCITALOPRAM OXALATE 20 MG/1
20 TABLET ORAL DAILY
Qty: 90 TABLET | Refills: 1 | Status: SHIPPED | OUTPATIENT
Start: 2024-02-26 | End: 2024-08-27

## 2024-02-26 NOTE — TELEPHONE ENCOUNTER
Sending to PCP for review.  Please review and advise on patient MyChart message.    Patient requesting increase in escitalopram. Patient seen 1/15/2024 for routine postpartum follow up.    Reece Oswald RN  [facility]

## 2024-06-27 ENCOUNTER — PATIENT OUTREACH (OUTPATIENT)
Dept: FAMILY MEDICINE | Facility: CLINIC | Age: 34
End: 2024-06-27
Payer: COMMERCIAL

## 2024-06-27 PROBLEM — R87.810 CERVICAL HIGH RISK HPV (HUMAN PAPILLOMAVIRUS) TEST POSITIVE: Status: ACTIVE | Noted: 2022-06-08

## 2024-07-20 ENCOUNTER — HEALTH MAINTENANCE LETTER (OUTPATIENT)
Age: 34
End: 2024-07-20

## 2024-08-27 ENCOUNTER — MYC REFILL (OUTPATIENT)
Dept: FAMILY MEDICINE | Facility: CLINIC | Age: 34
End: 2024-08-27

## 2024-08-27 DIAGNOSIS — F42.8 OBSESSIONAL THOUGHTS: ICD-10-CM

## 2024-08-27 DIAGNOSIS — F32.0 CURRENT MILD EPISODE OF MAJOR DEPRESSIVE DISORDER WITHOUT PRIOR EPISODE (H): ICD-10-CM

## 2024-08-27 DIAGNOSIS — F41.1 GENERALIZED ANXIETY DISORDER: ICD-10-CM

## 2024-08-27 PROBLEM — O09.529 SUPERVISION OF HIGH-RISK PREGNANCY OF ELDERLY MULTIGRAVIDA: Status: RESOLVED | Noted: 2023-07-17 | Resolved: 2024-08-27

## 2024-08-27 PROBLEM — Z98.891 H/O CESAREAN SECTION: Status: RESOLVED | Noted: 2023-06-06 | Resolved: 2024-08-27

## 2024-08-27 RX ORDER — ESCITALOPRAM OXALATE 20 MG/1
20 TABLET ORAL DAILY
Qty: 90 TABLET | Refills: 1 | Status: SHIPPED | OUTPATIENT
Start: 2024-08-27 | End: 2024-10-07

## 2024-10-02 SDOH — HEALTH STABILITY: PHYSICAL HEALTH: ON AVERAGE, HOW MANY DAYS PER WEEK DO YOU ENGAGE IN MODERATE TO STRENUOUS EXERCISE (LIKE A BRISK WALK)?: 4 DAYS

## 2024-10-02 SDOH — HEALTH STABILITY: PHYSICAL HEALTH: ON AVERAGE, HOW MANY MINUTES DO YOU ENGAGE IN EXERCISE AT THIS LEVEL?: 30 MIN

## 2024-10-02 ASSESSMENT — SOCIAL DETERMINANTS OF HEALTH (SDOH): HOW OFTEN DO YOU GET TOGETHER WITH FRIENDS OR RELATIVES?: ONCE A WEEK

## 2024-10-07 ENCOUNTER — OFFICE VISIT (OUTPATIENT)
Dept: FAMILY MEDICINE | Facility: CLINIC | Age: 34
End: 2024-10-07
Payer: COMMERCIAL

## 2024-10-07 VITALS
HEIGHT: 63 IN | HEART RATE: 78 BPM | TEMPERATURE: 98 F | SYSTOLIC BLOOD PRESSURE: 106 MMHG | BODY MASS INDEX: 20.34 KG/M2 | DIASTOLIC BLOOD PRESSURE: 68 MMHG | WEIGHT: 114.8 LBS | OXYGEN SATURATION: 100 %

## 2024-10-07 DIAGNOSIS — Z01.419 ENCOUNTER FOR GYNECOLOGICAL EXAMINATION WITHOUT ABNORMAL FINDING: Primary | ICD-10-CM

## 2024-10-07 DIAGNOSIS — F32.0 CURRENT MILD EPISODE OF MAJOR DEPRESSIVE DISORDER WITHOUT PRIOR EPISODE (H): ICD-10-CM

## 2024-10-07 DIAGNOSIS — F42.8 OBSESSIONAL THOUGHTS: ICD-10-CM

## 2024-10-07 DIAGNOSIS — Z12.4 CERVICAL CANCER SCREENING: ICD-10-CM

## 2024-10-07 DIAGNOSIS — Z13.228 SCREENING FOR METABOLIC DISORDER: ICD-10-CM

## 2024-10-07 DIAGNOSIS — F41.1 GENERALIZED ANXIETY DISORDER: ICD-10-CM

## 2024-10-07 DIAGNOSIS — R87.810 CERVICAL HIGH RISK HPV (HUMAN PAPILLOMAVIRUS) TEST POSITIVE: ICD-10-CM

## 2024-10-07 LAB
CHOLEST SERPL-MCNC: 185 MG/DL
EST. AVERAGE GLUCOSE BLD GHB EST-MCNC: 94 MG/DL
FASTING STATUS PATIENT QL REPORTED: NORMAL
HBA1C MFR BLD: 4.9 % (ref 0–5.6)
HDLC SERPL-MCNC: 96 MG/DL
HPV HR 12 DNA CVX QL NAA+PROBE: NEGATIVE
HPV16 DNA CVX QL NAA+PROBE: NEGATIVE
HPV18 DNA CVX QL NAA+PROBE: NEGATIVE
HUMAN PAPILLOMA VIRUS FINAL DIAGNOSIS: NORMAL
LDLC SERPL CALC-MCNC: 82 MG/DL
NONHDLC SERPL-MCNC: 89 MG/DL
TRIGL SERPL-MCNC: 36 MG/DL

## 2024-10-07 PROCEDURE — 80061 LIPID PANEL: CPT | Performed by: FAMILY MEDICINE

## 2024-10-07 PROCEDURE — 87624 HPV HI-RISK TYP POOLED RSLT: CPT | Performed by: FAMILY MEDICINE

## 2024-10-07 PROCEDURE — 99395 PREV VISIT EST AGE 18-39: CPT | Mod: 25 | Performed by: FAMILY MEDICINE

## 2024-10-07 PROCEDURE — 83036 HEMOGLOBIN GLYCOSYLATED A1C: CPT | Performed by: FAMILY MEDICINE

## 2024-10-07 PROCEDURE — 90656 IIV3 VACC NO PRSV 0.5 ML IM: CPT | Performed by: FAMILY MEDICINE

## 2024-10-07 PROCEDURE — G0145 SCR C/V CYTO,THINLAYER,RESCR: HCPCS | Performed by: FAMILY MEDICINE

## 2024-10-07 PROCEDURE — G0124 SCREEN C/V THIN LAYER BY MD: HCPCS | Performed by: PATHOLOGY

## 2024-10-07 PROCEDURE — 36415 COLL VENOUS BLD VENIPUNCTURE: CPT | Performed by: FAMILY MEDICINE

## 2024-10-07 PROCEDURE — 90471 IMMUNIZATION ADMIN: CPT | Performed by: FAMILY MEDICINE

## 2024-10-07 PROCEDURE — 99214 OFFICE O/P EST MOD 30 MIN: CPT | Mod: 25 | Performed by: FAMILY MEDICINE

## 2024-10-07 RX ORDER — ESCITALOPRAM OXALATE 20 MG/1
20 TABLET ORAL DAILY
Qty: 90 TABLET | Refills: 1 | Status: SHIPPED | OUTPATIENT
Start: 2024-10-07 | End: 2024-10-07

## 2024-10-07 RX ORDER — ESCITALOPRAM OXALATE 20 MG/1
20 TABLET ORAL DAILY
Qty: 90 TABLET | Refills: 3 | Status: SHIPPED | OUTPATIENT
Start: 2024-10-07

## 2024-10-07 NOTE — PROGRESS NOTES
Preventive Care Visit  [facility] FERMIN Floyd MD, Family Medicine  Oct 7, 2024      Grecia was seen today for physical.    Diagnoses and all orders for this visit:    Encounter for gynecological examination without abnormal finding    Cervical high risk HPV (human papillomavirus) test positive  Comments:  pap done td  Orders:  -     HPV and Gynecologic Cytology Panel - Recommended Age 30 - 65 Years    Cervical cancer screening    Generalized anxiety disorder  Comments:  well controlled  Orders:  -     Discontinue: escitalopram (LEXAPRO) 20 MG tablet; Take 1 tablet (20 mg) by mouth daily.  -     escitalopram (LEXAPRO) 20 MG tablet; Take 1 tablet (20 mg) by mouth daily.    Current mild episode of major depressive disorder without prior episode (H)  Comments:  stable on 20 mg dose  Orders:  -     Discontinue: escitalopram (LEXAPRO) 20 MG tablet; Take 1 tablet (20 mg) by mouth daily.  -     escitalopram (LEXAPRO) 20 MG tablet; Take 1 tablet (20 mg) by mouth daily.    Obsessional thoughts  Comments:  doing well on current dose of 20 mg of lexapro  Orders:  -     Discontinue: escitalopram (LEXAPRO) 20 MG tablet; Take 1 tablet (20 mg) by mouth daily.  -     escitalopram (LEXAPRO) 20 MG tablet; Take 1 tablet (20 mg) by mouth daily.    Screening for metabolic disorder  -     Hemoglobin A1c; Future  -     Lipid Profile; Future  -     Hemoglobin A1c  -     Lipid Profile    Other orders  -     PRIMARY CARE FOLLOW-UP SCHEDULING; Future  -     INFLUENZA VACCINE,SPLIT VIRUS,TRIVALENT,PF(FLUZONE)  -     COVID-19 12+ (PFIZER); Future  -     PRIMARY CARE FOLLOW-UP SCHEDULING; Future         Subjective   Grecia is a 34 year old, presenting for the following:  Physical (Pap and HPV)    No LMP recorded. Breast feeding     10/7/2024    10:53 AM   Additional Questions   Roomed by Ronaldo   Accompanied by Self        Health Care Directive  Patient does not have a Health Care Directive or Living Will: Discussed  advance care planning with patient; however, patient declined at this time.    HPI  Wt Readings from Last 4 Encounters:   10/07/24 52.1 kg (114 lb 12.8 oz)   01/15/24 60.7 kg (133 lb 14.4 oz)   12/05/23 66.2 kg (146 lb)   11/29/23 70 kg (154 lb 4.8 oz)    Depression and Anxiety   How are you doing with your depression since your last visit? Improved   How are you doing with your anxiety since your last visit?  Improved   Are you having other symptoms that might be associated with depression or anxiety? Yes:  occasional   Have you had a significant life event? No   Do you have any concerns with your use of alcohol or other drugs? No     had vasectomy , no cycles as breast feeding , infant 10 month old   Social History     Tobacco Use    Smoking status: Former     Types: Cigarettes     Passive exposure: Past    Smokeless tobacco: Never    Tobacco comments:     No Vaping   Vaping Use    Vaping status: Never Used   Substance Use Topics    Alcohol use: Yes    Drug use: Yes     Frequency: 2.0 times per week     Types: Marijuana         8/22/2023     5:39 AM 9/14/2023    12:21 PM 10/7/2024    10:20 AM   PHQ   PHQ-9 Total Score 0 0 0   Q9: Thoughts of better off dead/self-harm past 2 weeks Not at all Not at all Not at all         2/1/2023    12:51 PM   ANURAG-7 SCORE   Total Score 9 (mild anxiety)   Total Score 9         Suicide Assessment Five-step Evaluation and Treatment (SAFE-T)          10/2/2024   General Health   How would you rate your overall physical health? Good   Feel stress (tense, anxious, or unable to sleep) Not at all            10/2/2024   Nutrition   Three or more servings of calcium each day? Yes   Diet: Regular (no restrictions)   How many servings of fruit and vegetables per day? (!) 2-3   How many sweetened beverages each day? (!) 2            10/2/2024   Exercise   Days per week of moderate/strenous exercise 4 days   Average minutes spent exercising at this level 30 min            10/2/2024    Social Factors   Frequency of gathering with friends or relatives Once a week   Worry food won't last until get money to buy more No   Food not last or not have enough money for food? No   Do you have housing? (Housing is defined as stable permanent housing and does not include staying ouside in a car, in a tent, in an abandoned building, in an overnight shelter, or couch-surfing.) Yes   Are you worried about losing your housing? No   Lack of transportation? No   Unable to get utilities (heat,electricity)? No            10/2/2024   Dental   Dentist two times every year? Yes               Today's PHQ-9 Score:       10/7/2024    10:20 AM   PHQ-9 SCORE   PHQ-9 Total Score MyChart 0   PHQ-9 Total Score 0           10/2/2024   Substance Use   Alcohol more than 3/day or more than 7/wk Not Applicable   Do you use any other substances recreationally? No        Social History     Tobacco Use    Smoking status: Former     Types: Cigarettes     Passive exposure: Past    Smokeless tobacco: Never    Tobacco comments:     No Vaping   Vaping Use    Vaping status: Never Used   Substance Use Topics    Alcohol use: Yes    Drug use: Yes     Frequency: 2.0 times per week     Types: Marijuana          Mammogram Screening - Patient under 40 years of age: Routine Mammogram Screening not recommended.         10/2/2024   STI Screening   New sexual partner(s) since last STI/HIV test? No        History of abnormal Pap smear: No - age 30- 64 PAP with HPV every 5 years recommended        Latest Ref Rng & Units 7/17/2023     9:54 AM 6/8/2022    11:00 AM   PAP / HPV   PAP  Negative for Intraepithelial Lesion or Malignancy (NILM)     HPV 16 DNA Negative Negative     HPV 18 DNA Negative Negative     Other HR HPV Negative Positive     PAP-ABSTRACT   See Scanned Document           This result is from an external source.           10/2/2024   Contraception/Family Planning   Questions about contraception or family planning No           Reviewed and  "updated as needed this visit by Provider   Tobacco  Allergies  Meds  Problems  Med Hx  Surg Hx  Fam Hx            OB History    Para Term  AB Living   2 2 2 0 1 3   SAB IAB Ectopic Multiple Live Births   0 0 1 2 3      # Outcome Date GA Lbr Justice/2nd Weight Sex Type Anes PTL Lv   2 Term 21 39w4d / 01:50 3 kg (6 lb 9.8 oz) F Vag-Spont EPI N KAYLEY      Name: Angélica Griffin      Apgar1: 8  Apgar5: 9   1A Ectopic 22 38w2d  2.48 kg (5 lb 7.5 oz) M CS-Unspec   KAYLEY      Birth Comments: scheduled C section for twins      Name: Berhane      Apgar1: 8  Apgar5: 9   1B Term 22 38w2d  2.722 kg (6 lb) M CS-Unspec   KAYLEY      Name: Praveen      Apgar1: 7  Apgar5: 9            Objective    Exam  /68 (BP Location: Left arm, Patient Position: Sitting, Cuff Size: Adult Regular)   Pulse 78   Temp 98  F (36.7  C) (Oral)   Ht 1.6 m (5' 3\")   Wt 52.1 kg (114 lb 12.8 oz)   SpO2 100%   Breastfeeding Yes   BMI 20.34 kg/m     Estimated body mass index is 20.34 kg/m  as calculated from the following:    Height as of this encounter: 1.6 m (5' 3\").    Weight as of this encounter: 52.1 kg (114 lb 12.8 oz).    Physical Exam  GENERAL: alert and no distress  NECK: no adenopathy, no asymmetry, masses, or scars  RESP: lungs clear to auscultation - no rales, rhonchi or wheezes  CV: regular rate and rhythm, normal S1 S2, no S3 or S4, no murmur, click or rub, no peripheral edema  ABDOMEN: soft, nontender, no hepatosplenomegaly, no masses and bowel sounds normal   (female) w/bimanual: normal female external genitalia, normal urethral meatus, normal vaginal mucosa, normal cervix/adnexa/uterus without masses or discharge  MS: no gross musculoskeletal defects noted, no edema        Signed Electronically by: Sherrie Floyd MD    "

## 2024-10-07 NOTE — PATIENT INSTRUCTIONS
Patient Education   Preventive Care Advice   This is general advice given by our system to help you stay healthy. However, your care team may have specific advice just for you. Please talk to your care team about your preventive care needs.  Nutrition  Eat 5 or more servings of fruits and vegetables each day.  Try wheat bread, brown rice and whole grain pasta (instead of white bread, rice, and pasta).  Get enough calcium and vitamin D. Check the label on foods and aim for 100% of the RDA (recommended daily allowance).  Lifestyle  Exercise at least 150 minutes each week  (30 minutes a day, 5 days a week).  Do muscle strengthening activities 2 days a week. These help control your weight and prevent disease.  No smoking.  Wear sunscreen to prevent skin cancer.  Have a dental exam and cleaning every 6 months.  Yearly exams  See your health care team every year to talk about:  Any changes in your health.  Any medicines your care team has prescribed.  Preventive care, family planning, and ways to prevent chronic diseases.  Shots (vaccines)   HPV shots (up to age 26), if you've never had them before.  Hepatitis B shots (up to age 59), if you've never had them before.  COVID-19 shot: Get this shot when it's due.  Flu shot: Get a flu shot every year.  Tetanus shot: Get a tetanus shot every 10 years.  Pneumococcal, hepatitis A, and RSV shots: Ask your care team if you need these based on your risk.  Shingles shot (for age 50 and up)  General health tests  Diabetes screening:  Starting at age 35, Get screened for diabetes at least every 3 years.  If you are younger than age 35, ask your care team if you should be screened for diabetes.  Cholesterol test: At age 39, start having a cholesterol test every 5 years, or more often if advised.  Bone density scan (DEXA): At age 50, ask your care team if you should have this scan for osteoporosis (brittle bones).  Hepatitis C: Get tested at least once in your life.  STIs (sexually  transmitted infections)  Before age 24: Ask your care team if you should be screened for STIs.  After age 24: Get screened for STIs if you're at risk. You are at risk for STIs (including HIV) if:  You are sexually active with more than one person.  You don't use condoms every time.  You or a partner was diagnosed with a sexually transmitted infection.  If you are at risk for HIV, ask about PrEP medicine to prevent HIV.  Get tested for HIV at least once in your life, whether you are at risk for HIV or not.  Cancer screening tests  Cervical cancer screening: If you have a cervix, begin getting regular cervical cancer screening tests starting at age 21.  Breast cancer scan (mammogram): If you've ever had breasts, begin having regular mammograms starting at age 40. This is a scan to check for breast cancer.  Colon cancer screening: It is important to start screening for colon cancer at age 45.  Have a colonoscopy test every 10 years (or more often if you're at risk) Or, ask your provider about stool tests like a FIT test every year or Cologuard test every 3 years.  To learn more about your testing options, visit:   .  For help making a decision, visit:   https://bit.ly/kx53196.  Prostate cancer screening test: If you have a prostate, ask your care team if a prostate cancer screening test (PSA) at age 55 is right for you.  Lung cancer screening: If you are a current or former smoker ages 50 to 80, ask your care team if ongoing lung cancer screenings are right for you.  For informational purposes only. Not to replace the advice of your health care provider. Copyright   2023 [employer]. All rights reserved. Clinically reviewed by the [facility] Transitions Program. [employer] [id number] - REV 01/24.

## 2024-10-10 ENCOUNTER — PATIENT OUTREACH (OUTPATIENT)
Dept: FAMILY MEDICINE | Facility: CLINIC | Age: 34
End: 2024-10-10
Payer: COMMERCIAL

## 2024-10-10 LAB
BKR AP ASSOCIATED HPV REPORT: ABNORMAL
BKR LAB AP GYN ADEQUACY: ABNORMAL
BKR LAB AP GYN INTERPRETATION: ABNORMAL
BKR LAB AP PREVIOUS ABNL DX: ABNORMAL
BKR LAB AP PREVIOUS ABNORMAL: ABNORMAL
PATH REPORT.COMMENTS IMP SPEC: ABNORMAL
PATH REPORT.COMMENTS IMP SPEC: ABNORMAL
PATH REPORT.RELEVANT HX SPEC: ABNORMAL

## 2024-12-12 ENCOUNTER — OFFICE VISIT (OUTPATIENT)
Dept: FAMILY MEDICINE | Facility: CLINIC | Age: 34
End: 2024-12-12
Payer: COMMERCIAL

## 2024-12-12 VITALS
TEMPERATURE: 97.8 F | RESPIRATION RATE: 12 BRPM | HEIGHT: 63 IN | WEIGHT: 117 LBS | SYSTOLIC BLOOD PRESSURE: 98 MMHG | DIASTOLIC BLOOD PRESSURE: 66 MMHG | OXYGEN SATURATION: 98 % | BODY MASS INDEX: 20.73 KG/M2 | HEART RATE: 58 BPM

## 2024-12-12 DIAGNOSIS — R87.810 CERVICAL HIGH RISK HPV (HUMAN PAPILLOMAVIRUS) TEST POSITIVE: Primary | ICD-10-CM

## 2024-12-12 NOTE — PROGRESS NOTES
Grecia Post is a .34 year old female who presents for initial colposcopy, referred by Sherrie Floyd  for abnormal pap smear The prior pap showed :    06/08/22 LSIL/HPV Negative Plan: Pap and HPV 6/2023 07/17/23 NIL Pap, +HR HPV (not 16 or 18) Plan cotest in 1 year due 07/17/24 07/24/23 Left msg and Mychart result note sent. Seen by patient Grecia Post on 7/24/2023  8:37 AM  10/07/24 LSIL Pap, Neg HR HPV Plan [place] due bef 10/7/25 per provider   10/11/24 left msg to call back. Mychart sent. Seen by patient Grecia Post on 10/11/2024  9:16 AM  12/12/24 [place]       Previous history of abnormal paps?: No  History of cryotherapy (freezing)?: : No  History of veneral diseases: : No  Do you desire testing for any of these diseases? : No  History of genital warts:  No  Visible warts now?:  No  Previously treated? If so, how?:  No     No LMP recorded.    Type of contraception: vasectomy--current partner  Age at first sexual intercourse: <18  Number of sexual partners (lifetime): <3  History   Smoking Status    Former    Types: Cigarettes   Smokeless Tobacco    Never       History of sexual abuse:  No    Allergies   Allergen Reactions    Contrast Dye      PN: LW CM1: >>> NO CONTRAST ADVERSE REACTION <<<     Reaction :    Methylpyrrolidone Unknown    Other Drug Allergy (See Comments) Unknown     Contrast media  Unsure what reaction was.        PROCEDURE:  Before the procedure, it was ensured that the patient was educated regarding the nature of her findings to date, the implications of them, and what was to be done. She has been made aware of the role of HPV, the natural history of infection, ways to minimize her future risk, the effect of HPV on the cervix, and treatment options available should they be indicated. The   pathophysiology of the cervix, including a discussion of squamous vs. endometrial cells, and squamous metaplasia have all been reviewed, using illustrations and sketches. The details of the  colposcopic procedure were reviewed, as well as the risks of missed diagnoses, pain, infection and bleeding. All questions were answered before proceeding, and informed consent was therefore obtained.    Bimanual examination: was performed and was unremarkable.    The following examination was done with the colposcope:    Unenhanced examination of the cervix showed scars from previous biopsy   Pap repeated?:  No  SCJ seen?:  yes  Endocervical speculum needed?:  No  ECC done?:  Yes   Lugol's solution used?:  Yes   Satisfactory examination?:  yes    Vaginal vault: normal to cursory inspection yes  Urethra normal?:  yes  Labia normal?:  yes  Perineum normal?:  yes  Rectum normal?:  yes    FINDINGS:    Cervix: acetowhitening noted  and acetowhite area from 10:00 to 3:00  Procedure: biopsies taken (not including ECC): 2.    Procedure summary: Patient tolerated procedure well     Assessment: HPV related changes and TREV 1      Plan: Specimens labelled and sent to pathology., Will base further treatment on pathology findings., treatment options discussed with patient, post biopsy instructions given to patient, and call to discuss Pathology results    Sherrie Floyd MD 12/12/2024 8:58 AM   [facility].  [phone]

## 2024-12-16 ENCOUNTER — NURSE TRIAGE (OUTPATIENT)
Dept: FAMILY MEDICINE | Facility: CLINIC | Age: 34
End: 2024-12-16
Payer: COMMERCIAL

## 2024-12-16 LAB
PATH REPORT.COMMENTS IMP SPEC: NORMAL
PATH REPORT.FINAL DX SPEC: NORMAL
PATH REPORT.GROSS SPEC: NORMAL
PATH REPORT.MICROSCOPIC SPEC OTHER STN: NORMAL
PATH REPORT.RELEVANT HX SPEC: NORMAL
PHOTO IMAGE: NORMAL

## 2024-12-17 NOTE — TELEPHONE ENCOUNTER
Situation:  One episode of 2 tissue sacs falling yesterday 12/16/24.     Background:  Colposcopy was done 12/12/24. Had done this in the past and had no issues.       Assessment:  Not passing any tissues today or days prior to yesterday.      Pass 2 tissues, types of sacs yesterday. One fell on her underwear and another fell while she was urinating.     Appearance: Darker skin color and able to break the skin. Denies it looking like blood or clots.     Denies abd pain or pain while urinating.   Denies any discomfort, fever.     Still having some minor spotting bleeding. This is expected when wiping.      She was itchy for 3 days after the procedure. Now itchiness went away.   Normal discharge per pt.   No odor. No other symptoms reported.   She felt fine and normal.     Denies any symptoms of pressure, heaviness, fullness.     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     Recommendation:  I am not able to find a protocol for this.     I advise to monitor for now since she is doing okay and didn't have any more episode since yesterday. I will pass on her concern to PCP to further advise.  Agreeing to plan. No further questions at this time.     Routed to provider    Does the patient meet one of the following criteria for ADS visit consideration? 16+ years old, with an [facility] PCP     TIP  Providers, please consider if this condition is appropriate for management at one of our Acute and Diagnostic Services sites.     If patient is a good candidate, please use dotphrase <dot>triageresponse and select Refer to ADS to document.     Reason for Disposition    Nursing judgment    Additional Information    Negative: Sounds like a life-threatening emergency to the triager    Negative: Followed a genital area injury (e.g., vagina, vulva)    Negative: Vaginal bleeding is main symptom    Negative: Vaginal discharge is main symptom    Negative: Pain or  "burning with passing urine (urination) is main symptom    Negative: Menstrual cramps is main symptom    Negative: Abdomen pain is main symptom    Negative: Pubic lice suspected    Negative: Itching or rash of female genital area (e.g., labia, vagina, vulva)    Negative: Pregnant and labor suspected    Negative: Patient sounds very sick or weak to the triager    Negative: SEVERE vaginal pain and not improved 2 hours after pain medicine    Negative: Genital area looks infected (e.g., draining sore, spreading redness) and fever    Negative: Something is hanging out of the vagina and can't easily be pushed back inside    Negative: MILD-MODERATE vaginal pain and present > 24 hours (Exception: Chronic pain.)    Negative: Genital area looks infected (e.g., draining sore, spreading redness)    Negative: Rash with painful tiny water blisters    Negative: MODERATE-SEVERE itching (i.e., interferes with school, work, or sleep)    Negative: Rash (e.g., redness, tiny bumps, sore) of genital area and present > 24 hours    Negative: Tender lump (swelling or \"ball\") at vaginal opening    Negative: Symptoms of a yeast infection (i.e., itchy, white discharge, not bad smelling) and not improved > 3 days following Care Advice    Negative: Vaginal itching and not improved > 3 days following Care Advice    Negative: Vaginal odor (bad smell) not improved > 3 days following Care Advice    Negative: Patient is worried they have a sexually transmitted infection (STI)    Negative: Patient wants to be seen    Negative: Feels like something inside is falling out of vagina (e.g., pressure, heaviness, fullness)    Negative: Vaginal dryness or itching and nearing menopause or after menopause    Negative: Pain with sexual intercourse (dyspareunia)  (Exception: Feels like prior yeast infection, minor abrasion, minor rash < 24 hour duration, mild itching.)    Negative: Pain in genital area is a chronic symptom (recurrent or ongoing AND present > 4 " weeks)    Negative: All other vaginal symptoms  (Exceptions: Feels like prior yeast infection, minor abrasion, mild rash < 24 hour duration, mild itching, vaginal dryness during sex.)    Negative: Symptoms of a yeast infection (i.e., itchy, white discharge, not bad smelling) and feels like prior vaginal yeast infections    Negative: Mild rash (e.g., redness, tiny bumps, sore) of genital area and present < 24 hours    Negative: Mild vaginal itching    Negative: Mild vaginal odor    Negative: Vaginal dryness during sexual intercourse    Negative: Vaginal pessary, questions about    Negative: Sounds like a life-threatening emergency to the triager    Negative: Information only call about a Well Adult (no illness or injury)    Negative: Caller can't be reached by phone    Negative: Nursing judgment    Negative: Nursing judgment    Negative: Nursing judgment    Negative: Nursing judgment    Negative: Nursing judgment    Negative: Nursing judgment    Negative: Patient wants to be seen    Negative: Nursing judgment    Negative: Nursing judgment    Protocols used: Vaginal Symptoms-A-OH, No Protocol [id number]    Manjit KAISER RN

## 2024-12-17 NOTE — TELEPHONE ENCOUNTER
Left voicemail for pt requesting call back.     RN task: Triage pt's vaginal symptoms. Mom is RN at [facility].    Kayla Wise RN

## 2024-12-18 NOTE — TELEPHONE ENCOUNTER
Provider Response to 2nd Level Triage Request    I have reviewed the RN documentation. My recommendation is:  Normal finding nothing to worry

## 2024-12-18 NOTE — CONFIDENTIAL NOTE
Most likely blood clot from recent procedure , if no active bleeding nothing to worry    Sherrie Floyd MD

## 2024-12-18 NOTE — TELEPHONE ENCOUNTER
Per Dr. Floyd:     Most likely blood clot from recent procedure , if no active bleeding nothing to worry     Sherrie Floyd MD     RN called patient and advised of Dr. Floyd's recommendations. Patient reports she is feeling well, and only has light spotting. Patient will monitor and callback with any worsening symptoms.

## 2025-01-06 ENCOUNTER — PATIENT OUTREACH (OUTPATIENT)
Dept: FAMILY MEDICINE | Facility: CLINIC | Age: 35
End: 2025-01-06
Payer: COMMERCIAL

## 2025-01-06 PROBLEM — R87.810 CERVICAL HIGH RISK HPV (HUMAN PAPILLOMAVIRUS) TEST POSITIVE: Status: ACTIVE | Noted: 2022-06-08

## 2025-02-26 NOTE — PROGRESS NOTES
Grecia Post  [id number]  1990    Discharge follow-up plan discussed with patient, needs assessed. Patient requests follow-up phone call after discharge, declines home care visit. Phone number is correct. No additional needs identified at this time.     Yes